# Patient Record
Sex: MALE | Race: BLACK OR AFRICAN AMERICAN | NOT HISPANIC OR LATINO | Employment: UNEMPLOYED | URBAN - METROPOLITAN AREA
[De-identification: names, ages, dates, MRNs, and addresses within clinical notes are randomized per-mention and may not be internally consistent; named-entity substitution may affect disease eponyms.]

---

## 2021-05-29 ENCOUNTER — RECORDS - HEALTHEAST (OUTPATIENT)
Dept: ADMINISTRATIVE | Facility: CLINIC | Age: 46
End: 2021-05-29

## 2021-05-31 ENCOUNTER — RECORDS - HEALTHEAST (OUTPATIENT)
Dept: ADMINISTRATIVE | Facility: CLINIC | Age: 46
End: 2021-05-31

## 2021-06-01 ENCOUNTER — RECORDS - HEALTHEAST (OUTPATIENT)
Dept: ADMINISTRATIVE | Facility: CLINIC | Age: 46
End: 2021-06-01

## 2021-06-02 ENCOUNTER — RECORDS - HEALTHEAST (OUTPATIENT)
Dept: ADMINISTRATIVE | Facility: CLINIC | Age: 46
End: 2021-06-02

## 2023-06-16 ENCOUNTER — APPOINTMENT (OUTPATIENT)
Dept: RADIOLOGY | Facility: HOSPITAL | Age: 48
End: 2023-06-16
Attending: EMERGENCY MEDICINE
Payer: MEDICAID

## 2023-06-16 ENCOUNTER — HOSPITAL ENCOUNTER (EMERGENCY)
Facility: HOSPITAL | Age: 48
Discharge: HOME | End: 2023-06-17
Attending: EMERGENCY MEDICINE | Admitting: EMERGENCY MEDICINE
Payer: MEDICAID

## 2023-06-16 DIAGNOSIS — M25.572 LEFT ANKLE PAIN, UNSPECIFIED CHRONICITY: ICD-10-CM

## 2023-06-16 DIAGNOSIS — S82.892A ANKLE FRACTURE, LEFT, CLOSED, INITIAL ENCOUNTER: ICD-10-CM

## 2023-06-16 PROCEDURE — 99284 EMERGENCY DEPT VISIT MOD MDM: CPT | Mod: 25

## 2023-06-16 PROCEDURE — 27786 TREATMENT OF ANKLE FRACTURE: CPT | Mod: LT

## 2023-06-16 PROCEDURE — 73610 X-RAY EXAM OF ANKLE: CPT | Mod: RT

## 2023-06-17 VITALS
HEART RATE: 72 BPM | TEMPERATURE: 98.5 F | HEIGHT: 66 IN | WEIGHT: 197 LBS | SYSTOLIC BLOOD PRESSURE: 187 MMHG | DIASTOLIC BLOOD PRESSURE: 107 MMHG | RESPIRATION RATE: 16 BRPM | BODY MASS INDEX: 31.66 KG/M2 | OXYGEN SATURATION: 99 %

## 2023-06-17 PROCEDURE — 250N000013 HC RX MED GY IP 250 OP 250 PS 637: Performed by: EMERGENCY MEDICINE

## 2023-06-17 RX ORDER — IBUPROFEN 600 MG/1
600 TABLET, FILM COATED ORAL ONCE
Status: COMPLETED | OUTPATIENT
Start: 2023-06-17 | End: 2023-06-17

## 2023-06-17 RX ORDER — HYDROCODONE BITARTRATE AND ACETAMINOPHEN 5; 325 MG/1; MG/1
1 TABLET ORAL EVERY 6 HOURS PRN
Qty: 6 TABLET | Refills: 0 | Status: SHIPPED | OUTPATIENT
Start: 2023-06-17 | End: 2023-06-20

## 2023-06-17 RX ADMIN — IBUPROFEN 600 MG: 600 TABLET ORAL at 00:21

## 2023-06-17 NOTE — ED PROVIDER NOTES
"EMERGENCY DEPARTMENT ENCOUNTER      NAME: Rinku Jara  YOB: 1975  MRN: [id number]    FINAL IMPRESSION  1. Ankle fracture, left, closed, initial encounter    2. Left ankle pain, unspecified chronicity        MEDICAL DECISION MAKING   Pertinent Labs & Imaging studies reviewed. (See chart for details)    Rinku Jara is a 47 year old male who presents for evaluation of ankle pain.  Patient reports that he fell on [place], 3/17, and twisted his left ankle.  He was seen in clinic at that time and diagnosed with a fracture but reports that he became frustrated and felt as though his clinic was just \"trying to make money off me.\"  As such, he has not followed through with recommendations regarding this injury.  He does have a brace but reports that he never obtained the specific one they recommended.  He decided to come into the ED today due to persistence of pain which she states is now radiating up towards his back and into his contralateral lower extremity.  He has not had any new injuries since that on 3/17.  He reports that the pain is mostly localized over the lateral aspect of his ankle and he has associated swelling.  He has been using OTC analgesics without much relief. Remainder of history and exam, as below.     Records reviewed.  Patient was seen at urgent care on 3/31 for evaluation of ankle pain that started 7 days previously.  An x-ray at that time revealed a left distal fibular fracture with slight displacement.  He was given an air stirrup splint and told to wear this for 3 weeks.  He was seen again at urgent care on 5/1/2023 with complaints of 2 to 3 weeks of bilateral leg pain and ongoing pain in his left ankle.  A repeat x-ray of the ankle showed a mildly displaced fracture of the lateral malleolus, essentially unchanged.  It was felt that his back pain was related to sciatica and he was started on a steroid taper.  He was also given a referral to orthopedics.  Patient was " seen by orthopedics on 5/3 and had a repeat x-ray that showed a fracture with incomplete healing.  It was felt that majority of his pain was coming from his sciatic type symptoms.  He was advised to continue using the ankle brace, and start physical therapy.  Finally, he was seen in urgent care on 5/11/2023 with complaints of back pain.  It was felt that his symptoms were related to lumbar radiculopathy.  He was given instructions for conservative management.    Today, an x-ray of the ankle was obtained while patient was awaiting evaluation in triage.  Per my read, this showed evidence of a chronic appearing fracture of the distal fibula without obvious acute new injury.  Distal CMS is intact so I have low suspicion for neurovascular injury.  At this point, I suspect ongoing pain is related to now chronic appearing injury of the ankle.  He is not concerned about his back pain today is much as he is his ankle.  I suspect his sciatic type pain and low back pain is related to him compensating for left ankle pain, especially given it did not improve much with steroid taper.  I discussed the case with [facility] who agreed with plan to place patient in a cam boot and have him follow-up in clinic.  I updated patient with these recommendations.  He felt comfortable with this plan.  We will send with left small number of Norco to use for pain not relieved by Tylenol/Motrin.    Patient was fitted with a cam boot and given a dose of Motrin.  After this, he was eager and ready to go home.  Strict return precautions and follow up recommendations were discussed and all questions were answered. Patient endorsed understanding and was in agreement with plan.    I independently reviewed XR (as noted above), formal radiologist read pending.      Medical Decision Making    History:    Supplemental history from: N/A    External Record(s) reviewed: Prior Imaging: Prior x-rays of the left ankle    Work Up:    Chart documentation  includes differential considered and any EKGs or imaging independently interpreted by provider, where specified.    In additional to work up documented, I considered the following work up: Documented in chart, if applicable.    External consultation:    Discussion of management with another provider: Documented in chart, if applicable    Complicating factors:    Care impacted by chronic illness: N/A    Care affected by social determinants of health: Access to Medical Care and Access to Affordable Health Care    Disposition considerations: Discharge. I prescribed additional prescription strength medication(s) as charted. See documentation for any additional details.          ED COURSE  12:00 AM I introduced myself to the patient, obtained patient history, performed a physical exam, and discussed plan for ED workup including potential diagnostic laboratory/imaging studies and interventions.  12:33 AM Spoke with Dr. Denise, orthopedics. Discussed the patient's case and follow-up recommendations. They will have someone from [facility] call the patient to schedule outpatient follow-up.  12:42 AM Rechecked and updated the patient. We discussed the plan for discharge and the patient is agreeable. Reviewed supportive cares, symptomatic treatment, outpatient follow up, and reasons to return to the Emergency Department. Patient to be discharged by ED RN.       MEDICATIONS GIVEN IN THE ED  Medications   ibuprofen (ADVIL/MOTRIN) tablet 600 mg (600 mg Oral $Given 6/17/23 0021)       NEW PRESCRIPTIONS STARTED AT TODAY'S VISIT  Discharge Medication List as of 6/17/2023 12:49 AM             =================================================================    Chief Complaint   Patient presents with     Ankle Pain         HPI:    Patient information was obtained from: Patient    Use of : N/A    Rinku Jara is a 47 year old male who presents for evaluation fever left ankle pain.    The patient reports on St.  "[place] Day (approximately 3 months ago) he slipped in a parking lot and injured his left ankle. After the slip he had left ankle pain and swelling. A couple weeks later he had imaging of the ankle and was told he had fractured the ankle. He was following up through the [facility] clinic, however, he became frustrated because he was having issues with his insurance and it seemed that every visit he had with the clinic was more about money than actually helping him heal.    At home he has been using Tylenol for pain control and has an ankle brace he purchased which he tries to wear to help the pain. He has never used crutches. He has not had any additional injury to the left ankle. He is presenting this evening with ongoing left ankle pain. He describes the pain as sharp and burning in nature.    RELEVANT HISTORY, MEDICATIONS, & ALLERGIES   Past medical history, surgical history, family history, medications, and allergies reviewed and pertinent noted in HPI.    REVIEW OF SYSTEMS:  A complete review of systems was performed with pertinent positives and negatives noted in the HPI. All other systems negative.     PHYSICAL EXAM:    Vitals: BP (!) 187/107   Pulse 72   Temp 98.5  F (36.9  C) (Temporal)   Resp 16   Ht 1.676 m (5' 6\")   Wt 89.4 kg (197 lb)   SpO2 99%   BMI 31.80 kg/m    General: Awake, alert, interactive.   Eyes: PERRL.   HENT: Atraumatic. MMM.   Neck: Full AROM.  Cardiovascular: Regular rate.  Respiratory/Chest: Normal work of breathing.   Abdomen: Non-distended.   Musculoskeletal: Normal appearing upper and right lower extremities without obvious deformities or signs of trauma.   Left lower extremity: Diffuse tenderness palpation about bilateral malleoli, worse laterally.  Palpable distal pulses.  Sensation intact throughout all distributions.  No tenderness palpation of distal lower extremity.  Slightly limited range of motion of ankle secondary to pain.  Normal range of motion of knee " and hip.  Skin: Normal color. No rash or diaphoresis.  Neurologic: Alert, oriented. Speech clear. CN's grossly intact. Moving all extremities spontaneously.  Ambulatory with slightly antalgic gait.  No assistance required.  Psychiatric: Normal affect/mood.         RADIOLOGY  Ankle XR, G/E 3 views, right   Final Result   IMPRESSION: There appears to be a chronic fracture deformity of the distal fibula. There is bandlike lucency through the distal fibula raising concern for an acute fracture. Correlation with clinical examination and patient history recommended. Follow-up    radiographs or CT could be performed for further evaluation. Mortise remains congruent. Mild enthesopathy at the Achilles insertion and origin of plantar fascia.            I, Wayne Ahumada, am serving as a scribe to document services personally performed by Dr. Dorothy Infante based on my observation and the provider's statements to me. IDorothy MD attest that Wayne Ahumada is acting in a scribe capacity, has observed my performance of the services and has documented them in accordance with my direction.    Dorothy Infante M.D.  Emergency Medicine  [facility] EMERGENCY DEPARTMENT  [facility] [facility] [id number]  [phone]  Dept: [phone]     Dorothy Infante MD  06/17/23 0451

## 2023-06-17 NOTE — DISCHARGE INSTRUCTIONS
You were seen in the emergency department today for ankle pain.  As we discussed, your x-ray showed that you broke the bone back in March.     To help with pain:  - Ice the injury for 20 minutes, 3-5 times per day (or up to every 2 hours as needed) for the first 24-72 hours. You can either use an ice pack or plastic bag filled with ice, cover either one with a damp cloth to prevent the cold from burning your skin.   - Take 650-1000 mg of Acetaminophen (Tylenol) (no more than 3000 mg in 24 hours).  - Take 600 mg of Ibuprofen (Motrin, Advil) by mouth with food every 6-8 hours (no more than 3200 mg in 24hrs).   - Norco every 4-6 hours for the next 24-48 hours. Only use this for severe pain that does not get better with tylenol and ibuprofen. It will likely make you feel drowsy so you should try to take it at night to lessen the pain and help you sleep. This DOES contain 325 mg acetaminophen so if you take it, be sure you are not taking too much tylenol in addition.     You should take ibuprofen and tylenol for baseline pain. You can take one or the other every 3 hours while awake (such that each is taken every 6 hours). For example, if you take Tylenol when you get home then you would take ibuprofen 3 hours later followed by another Tylenol dose 3 hours after that. Write down the times you are taking both medications to ensure appropriate time in between doses.    Note: Norco is a strong narcotic pain medication that can lead to addiction and should be used carefully.  Please don't take more than the recommended dose and limit your use of this medication as much as possible.  Please don't take this medication with other medications or drugs that cause you to feel sleepy (alcohol, benzodiazepines, etc) because it may impair your ability to breathe.  In addition, please do not take it prior to performing activities such as driving, operating power tools, or other activities that carry a risk of bodily harm.     You will  likely find that you are more sore over the next 1-2 days, but please return to the Emergency Department if you have a significant increase in pain, difficulty breathing, numbness, weakness, or any other new or concerning symptoms. Otherwise please follow up with the orthopedic surgery team in 1 week for recheck.    Below is some information that you might find informative and useful.    Thank you for choosing [facility]. It was a pleasure taking care of you today!  - Dr. Dorothy Infante

## 2023-06-17 NOTE — ED TRIAGE NOTES
"Pt ambulated into ED with c/o right ankle pain.  Pt reports slip and fall in a parking lot on [place] on [place] 17th.  Pt last took Tylenol for pain \"a few hours ago\".      "

## 2023-08-08 ENCOUNTER — HOSPITAL ENCOUNTER (EMERGENCY)
Facility: HOSPITAL | Age: 48
Discharge: HOME | End: 2023-08-08
Attending: EMERGENCY MEDICINE | Admitting: EMERGENCY MEDICINE
Payer: COMMERCIAL

## 2023-08-08 VITALS
OXYGEN SATURATION: 99 % | BODY MASS INDEX: 31.98 KG/M2 | RESPIRATION RATE: 18 BRPM | WEIGHT: 199 LBS | HEART RATE: 88 BPM | TEMPERATURE: 97.3 F | SYSTOLIC BLOOD PRESSURE: 176 MMHG | DIASTOLIC BLOOD PRESSURE: 109 MMHG | HEIGHT: 66 IN

## 2023-08-08 DIAGNOSIS — M54.50 ACUTE EXACERBATION OF CHRONIC LOW BACK PAIN: ICD-10-CM

## 2023-08-08 DIAGNOSIS — G89.29 ACUTE EXACERBATION OF CHRONIC LOW BACK PAIN: ICD-10-CM

## 2023-08-08 PROCEDURE — 96372 THER/PROPH/DIAG INJ SC/IM: CPT | Performed by: EMERGENCY MEDICINE

## 2023-08-08 PROCEDURE — 250N000011 HC RX IP 250 OP 636: Mod: JZ | Performed by: EMERGENCY MEDICINE

## 2023-08-08 PROCEDURE — 250N000013 HC RX MED GY IP 250 OP 250 PS 637: Performed by: EMERGENCY MEDICINE

## 2023-08-08 PROCEDURE — 99284 EMERGENCY DEPT VISIT MOD MDM: CPT

## 2023-08-08 RX ORDER — ACETAMINOPHEN 325 MG/1
975 TABLET ORAL ONCE
Status: COMPLETED | OUTPATIENT
Start: 2023-08-08 | End: 2023-08-08

## 2023-08-08 RX ORDER — OXYCODONE HYDROCHLORIDE 5 MG/1
5 TABLET ORAL ONCE
Status: COMPLETED | OUTPATIENT
Start: 2023-08-08 | End: 2023-08-08

## 2023-08-08 RX ORDER — OXYCODONE HYDROCHLORIDE 5 MG/1
5 TABLET ORAL EVERY 6 HOURS PRN
Qty: 10 TABLET | Refills: 0 | Status: SHIPPED | OUTPATIENT
Start: 2023-08-08 | End: 2023-08-11

## 2023-08-08 RX ORDER — KETOROLAC TROMETHAMINE 30 MG/ML
30 INJECTION, SOLUTION INTRAMUSCULAR; INTRAVENOUS ONCE
Status: COMPLETED | OUTPATIENT
Start: 2023-08-08 | End: 2023-08-08

## 2023-08-08 RX ORDER — GABAPENTIN 300 MG/1
CAPSULE ORAL
Qty: 45 CAPSULE | Refills: 0 | Status: SHIPPED | OUTPATIENT
Start: 2023-08-08 | End: 2023-11-30

## 2023-08-08 RX ORDER — OXYCODONE HYDROCHLORIDE 5 MG/1
10 TABLET ORAL ONCE
Status: DISCONTINUED | OUTPATIENT
Start: 2023-08-08 | End: 2023-08-08

## 2023-08-08 RX ADMIN — ACETAMINOPHEN 975 MG: 325 TABLET ORAL at 05:51

## 2023-08-08 RX ADMIN — OXYCODONE HYDROCHLORIDE 5 MG: 5 TABLET ORAL at 05:51

## 2023-08-08 RX ADMIN — KETOROLAC TROMETHAMINE 30 MG: 30 INJECTION, SOLUTION INTRAMUSCULAR; INTRAVENOUS at 05:51

## 2023-08-08 ASSESSMENT — ENCOUNTER SYMPTOMS
NUMBNESS: 1
BACK PAIN: 1

## 2023-08-08 ASSESSMENT — ACTIVITIES OF DAILY LIVING (ADL): ADLS_ACUITY_SCORE: 36

## 2023-08-08 NOTE — ED TRIAGE NOTES
Sciatic pain of 10 shooting down back into both legs. He states he has had back pains since breaking left ankle. He wants pain control. Had MRI within month.     Triage Assessment       Row Name 08/08/23 0518       Triage Assessment (Adult)    Airway WDL WDL       Respiratory WDL    Respiratory WDL WDL       Skin Circulation/Temperature WDL    Skin Circulation/Temperature WDL WDL       Cardiac WDL    Cardiac WDL X  HTN       Peripheral/Neurovascular WDL    Peripheral Neurovascular WDL WDL       Cognitive/Neuro/Behavioral WDL    Cognitive/Neuro/Behavioral WDL WDL

## 2023-08-08 NOTE — ED NOTES
Pt alert and oriented x4. Ambulated with a steady gait. Discharged to home by self. Pt given printed rx. Pt referred to spine clinic.

## 2023-08-08 NOTE — DISCHARGE INSTRUCTIONS
The prescribed oxycodone as needed for any further pain in addition to over-the-counter ibuprofen.   You can also take the prescribed gabapentin as directed to help with the burning nerve type pain.      Referral has been placed for you to follow-up with the spine clinic.    Turn back to ED sooner for any worsening back pain, numbness/tingling/weakness in your legs, loss of bowel or bladder control, or any other new or concerning symptoms.

## 2023-08-08 NOTE — ED PROVIDER NOTES
EMERGENCY DEPARTMENT ENCOUNTER      NAME: Rinku Jara  AGE: 47 year old male  YOB: 1975  MRN: [id number]  EVALUATION DATE & TIME: 2023  5:20 AM    PCP: No Ref-Primary, Physician    ED PROVIDER: Trenton Bernal DO      Chief Complaint   Patient presents with    Back Pain         FINAL IMPRESSION:  1. Acute exacerbation of chronic low back pain          ED COURSE & MEDICAL DECISION MAKIN-year-old male with a history of low back pain that has been ongoing for the last few months presented to the ED for evaluation of an estimation of his chronic low back pain that started a few days ago.  The patient denied any new trauma or injury.  Patient states that the pain radiates down both legs but he denied any worsening numbness/tingling/weakness in the lower extremities or any associated loss of bowel or bladder control.  Upon arrival to the ED the patient was hypertensive and tachycardic.  He was otherwise hemodynamically stable.  The patient did not appear to be in any obvious distress at the time of his initial evaluation.  On exam the patient had mild nasal patient noted over the midline low back.  However, there were no focal neurovascular deficits noted to the bilateral lower extremities.      Prior to my evaluation the patient received oxycodone, Tylenol, and IM Toradol.  Patient stated that the pain had improved with these medications at the time of his initial evaluation.      Of note, the patient states that he underwent an MRI of his lumbar spine within the last month for the same pain.  I was unable to find the results of the MRI within [facility].  However, the patient states that he was never informed of any acute abnormalities by the ordering orthopedic provider.    Because there were no focal vascular deficits noted on exam, repeat imaging with an MRI was not felt to be necessary.  Because there were no new reports of trauma or injury x-rays were also not indicated at this time.    After  educating and reassuring the patient he felt comfortable returning home.  The patient was sent home with a few tabs of oxycodone to take as needed for any further pain in addition to over-the-counter ibuprofen.  The patient was also sent home with a gabapentin taper to use for the next 2 weeks as his pain seems to be neuropathic in nature.  Referral to the spine [place] was also placed for the patient here in the ED.  The patient was instructed to follow-up with the spine clinic for reevaluation or to return back to ED sooner for any worsening pain, worsening numbness/ting/weakness in the lower extremities, loss of bowel bladder control, or any other new or concerning symptoms.    Pertinent Labs & Imaging studies reviewed. (See chart for details)  7:10 AM I met with the patient to gather history and to perform my initial exam. We discussed plans for the ED course, including diagnostic testing and treatment.       At the conclusion of the encounter I discussed the results of all of the tests and the disposition. The questions were answered. The patient or family acknowledged understanding and was agreeable with the care plan.     Medical Decision Making    History:  Supplemental history from: Documented in chart, if applicable  External Record(s) reviewed: Documented in chart, if applicable.    Work Up:  Chart documentation includes differential considered and any EKGs or imaging independently interpreted by provider, where specified.  In additional to work up documented, I considered the following work up: Documented in chart, if applicable.    External consultation:  Discussion of management with another provider: Documented in chart, if applicable    Complicating factors:  Care impacted by chronic illness: N/A  Care affected by social determinants of health: N/A    Disposition considerations: Discharge. I prescribed additional prescription strength medication(s) as charted. N/A.      PPE worn: n95 mask,  goggles    MEDICATIONS GIVEN IN THE EMERGENCY:  Medications   ketorolac (TORADOL) injection 30 mg (30 mg Intramuscular $Given 8/8/23 0551)   acetaminophen (TYLENOL) tablet 975 mg (975 mg Oral $Given 8/8/23 0551)   oxyCODONE (ROXICODONE) tablet 5 mg (5 mg Oral $Given 8/8/23 0551)       NEW PRESCRIPTIONS STARTED AT TODAY'S ER VISIT  Discharge Medication List as of 8/8/2023  7:41 AM             =================================================================    HPI    Patient information was obtained from: patient    Use of : N/A      Rinkufreddie Jara is a 47 year old male with no pertinent history who presents to this ED by private car for evaluation of back pain.    The patient presented that he has been experiencing back pain that radiates down both of his legs for the last couple months. He reported that it got worse last night and is now a sharp and burning pain. He has taken ibuprofen and tylenol with no relief.    He also presents numbness in his buttocks. Of note, he stated that he fractured his left ankle in March from a fall.    No recent falls or injuries. He denies loss of bowel or bladder control. No other acute symptoms presented.       REVIEW OF SYSTEMS   Review of Systems   Musculoskeletal:  Positive for back pain (sharp and burning).   Neurological:  Positive for numbness (in his buttocks).   All other systems reviewed and are negative.       PAST MEDICAL HISTORY:  No past medical history on file.    PAST SURGICAL HISTORY:  Past Surgical History:   Procedure Laterality Date    REPAIR TENDON ACHILLES             CURRENT MEDICATIONS:    gabapentin (NEURONTIN) 300 MG capsule  oxyCODONE (ROXICODONE) 5 MG tablet  cholecalciferol, vitamin D3, 2,000 unit Tab  dextroamphetamine-amphetamine (ADDERALL) 20 mg Tab  dextroamphetamine-amphetamine (AMPHETAMINE-DEXTROAMPHETAMINE) 30 mg Tab  DULoxetine (CYMBALTA) 60 MG capsule  DULOXETINE HCL (CYMBALTA ORAL)  etodolac (LODINE) 300 MG capsule  metFORMIN  "(GLUCOPHAGE) 1000 MG tablet  metoclopramide (REGLAN) 10 MG tablet  miscellaneous medical supply [facility]  ondansetron (ZOFRAN ODT) 4 MG disintegrating tablet  traMADol (ULTRAM) 50 mg tablet  VITAMIN A/VITAMIN D3 (NATURAL VITAMIN D ORAL)        ALLERGIES:  No Known Allergies    FAMILY HISTORY:  No family history on file.    SOCIAL HISTORY:   Social History     Socioeconomic History    Marital status: Single   Tobacco Use    Smoking status: Former     Types: Cigarettes   Substance and Sexual Activity    Alcohol use: No    Drug use: No       VITALS:  BP (!) 176/109   Pulse 88   Temp 97.3  F (36.3  C) (Temporal)   Resp 18   Ht 1.676 m (5' 6\")   Wt 90.3 kg (199 lb)   SpO2 99%   BMI 32.12 kg/m      PHYSICAL EXAM    General presentation: Alert, Vital signs reviewed. NAD  HENT: ENT inspection is normal. Oropharynx is moist and clear.   Eye: Pupils are equal and reactive to light. EOMI  Neck: The neck is supple, with full ROM, with no evidence of meningismus.  Pulmonary: Currently in no acute respiratory distress. Normal, non labored respirations, the lung sounds are normal with good equal air movement. Clear to auscultation bilaterally.   Circulatory: Regular rate and rhythm. Peripheral pulses are strong and equal. No murmurs, rubs, or gallops.   Abdominal: The abdomen is soft. Nontender. No rigidity, guarding, or rebound. Bowel sounds normal.   Neurologic: Alert, oriented to person, place, and time.  Strength is 5/5 in the bilateral lower extremities.  No sensory discrepancies to light touch noted in the bilateral lower extremities.  Straight leg raising test is negative bilaterally. Cranial nerves II through XII are intact.   Musculoskeletal: No extremity tenderness. Full range of motion in all extremities. No extremity edema.  Mild tenderness palpation noted over the lower lumbar spine.  Skin: Skin color is normal. No rash. Warm. Dry to touch.       Radha PATEL  , am serving as a scribe to document services " personally performed by Trenton Bernal DO based on my observation and the provider's statements to me. I, Trenton Bernal, attest that Radha Sanches  is acting in a scribe capacity, has observed my performance of the services and has documented them in accordance with my direction.    Trenton Bernal DO  Emergency Medicine  [facility] EMERGENCY DEPARTMENT  [address] [place] [id number] [phone]       rTenton Bernal DO  08/08/23 0837

## 2023-08-24 ENCOUNTER — HOSPITAL ENCOUNTER (EMERGENCY)
Facility: HOSPITAL | Age: 48
Discharge: HOME | End: 2023-08-24
Attending: EMERGENCY MEDICINE | Admitting: EMERGENCY MEDICINE
Payer: COMMERCIAL

## 2023-08-24 VITALS
DIASTOLIC BLOOD PRESSURE: 99 MMHG | HEART RATE: 83 BPM | RESPIRATION RATE: 18 BRPM | OXYGEN SATURATION: 100 % | TEMPERATURE: 98.7 F | SYSTOLIC BLOOD PRESSURE: 163 MMHG

## 2023-08-24 DIAGNOSIS — M54.50 CHRONIC RIGHT-SIDED LOW BACK PAIN WITHOUT SCIATICA: ICD-10-CM

## 2023-08-24 DIAGNOSIS — G89.29 CHRONIC RIGHT-SIDED LOW BACK PAIN WITHOUT SCIATICA: ICD-10-CM

## 2023-08-24 DIAGNOSIS — R03.0 ELEVATED BLOOD PRESSURE READING WITHOUT DIAGNOSIS OF HYPERTENSION: ICD-10-CM

## 2023-08-24 PROCEDURE — 250N000013 HC RX MED GY IP 250 OP 250 PS 637: Performed by: EMERGENCY MEDICINE

## 2023-08-24 PROCEDURE — 99284 EMERGENCY DEPT VISIT MOD MDM: CPT

## 2023-08-24 RX ORDER — METHYLPREDNISOLONE 4 MG
TABLET, DOSE PACK ORAL
Qty: 21 TABLET | Refills: 0 | Status: SHIPPED | OUTPATIENT
Start: 2023-08-24

## 2023-08-24 RX ORDER — METHOCARBAMOL 500 MG/1
500 TABLET, FILM COATED ORAL ONCE
Status: COMPLETED | OUTPATIENT
Start: 2023-08-24 | End: 2023-08-24

## 2023-08-24 RX ORDER — IBUPROFEN 600 MG/1
600 TABLET, FILM COATED ORAL ONCE
Status: COMPLETED | OUTPATIENT
Start: 2023-08-24 | End: 2023-08-24

## 2023-08-24 RX ORDER — METHOCARBAMOL 500 MG/1
500 TABLET, FILM COATED ORAL 4 TIMES DAILY PRN
Qty: 40 TABLET | Refills: 0 | Status: SHIPPED | OUTPATIENT
Start: 2023-08-24

## 2023-08-24 RX ADMIN — IBUPROFEN 600 MG: 600 TABLET ORAL at 05:42

## 2023-08-24 RX ADMIN — METHOCARBAMOL 500 MG: 500 TABLET, FILM COATED ORAL at 05:42

## 2023-08-24 ASSESSMENT — ACTIVITIES OF DAILY LIVING (ADL): ADLS_ACUITY_SCORE: 35

## 2023-08-24 NOTE — ED PROVIDER NOTES
EMERGENCY DEPARTMENT ENCOUNTER      NAME: Rinku Jara  AGE: 47 year old male  YOB: 1975  MRN: [id number]  EVALUATION DATE & TIME: 8/24/2023  4:37 AM    PCP: No Ref-Primary, Physician    ED PROVIDER: Radha Carbajal M.D.      Chief Complaint   Patient presents with    Leg Pain         FINAL IMPRESSION:  1. Chronic right-sided low back pain without sciatica    2. Elevated blood pressure reading without diagnosis of hypertension          ED COURSE & MEDICAL DECISION MAKING:    ED Course as of 08/24/23 0600   Thu Aug 24, 2023   0536 Patient with chronic ongoing nonmidline atruamatic back pain to right flank region with neuro intact examination and elevated BP which he notes he knows about and has not yet elected to follow up about but knows he should ultimately follow up for, ameanble to retrial of medrol dose pack with robaxin and spine follow up  referral to discuss whether the spine center recommends outpatient imaging studies for his ongoing atraumatic afebrile back pain neuro intact without h/o surgery. He was given primary care referral again for BP recheck and to discuss whether they recommend starting on antihypertensive therapy as his elevated BP could be dangerous in the future if untreated and certainly could predispose him to heart and vascular disease, he is aware and notes he will follow up and wants to update his phone number with registration as he previously gave a number that is not currently working, registration will check contact info with him now. Patient discharged after being provided with extensive anticipatory guidance and given return precautions, importance of PMD follow-up emphasized. Rx medrol dose pack and robaxin to preferred pharmacy.       Pertinent Labs & Imaging studies reviewed. (See chart for details)    N95 worn  A face shield was worn also  COVID PPE    Medical Decision Making    History:  Supplemental history from: Documented in chart, if  applicable  External Record(s) reviewed: Documented in chart, if applicable.    Work Up:  Chart documentation includes differential considered and any EKGs or imaging independently interpreted by provider, where specified.  In additional to work up documented, I considered the following work up: Documented in chart, if applicable.    External consultation:  Discussion of management with another provider: Documented in chart, if applicable    Complicating factors:  Care impacted by chronic illness: Chronic Pain  Care affected by social determinants of health: Access to Medical Care    Disposition considerations: Discharge. I prescribed additional prescription strength medication(s) as charted. See documentation for any additional details.        At the conclusion of the encounter I discussed the results of all of the tests and the disposition. The questions were answered. The patient or family acknowledged understanding and was agreeable with the care plan.     MEDICATIONS GIVEN IN THE EMERGENCY:  Medications   methocarbamol (ROBAXIN) tablet 500 mg (500 mg Oral $Given 8/24/23 0542)   ibuprofen (ADVIL/MOTRIN) tablet 600 mg (600 mg Oral $Given 8/24/23 0542)       NEW PRESCRIPTIONS STARTED AT TODAY'S ER VISIT  Discharge Medication List as of 8/24/2023  5:43 AM        START taking these medications    Details   methocarbamol (ROBAXIN) 500 MG tablet Take 1 tablet (500 mg) by mouth 4 times daily as needed for muscle spasms, Disp-40 tablet, R-0, E-Prescribe      methylPREDNISolone (MEDROL DOSEPAK) 4 MG tablet therapy pack Follow Package Directions, Disp-21 tablet, R-0, E-Prescribe                =================================================================    HPI      Rinku Jara is a 47 year old male with PMHx of chronic back pain since 2015 who presents to the ED today via ambulation with back pain and left leg pain.    Patient endorses chronic, ongoing midline lower back pain and sciatica pain to his feet. He  denies having seen a specialist as he does not have medicare. Has been taking ibuprofen for his back pain, last dose at 2300. Denies any history of back surgeries, recent falls or trauma, fevers, urinary issues, cough, or shortness of breath.     REVIEW OF SYSTEMS   All other systems reviewed and are negative except as noted above in HPI.    PAST MEDICAL HISTORY:  No past medical history on file.    PAST SURGICAL HISTORY:  Past Surgical History:   Procedure Laterality Date    REPAIR TENDON ACHILLES         CURRENT MEDICATIONS:    methocarbamol (ROBAXIN) 500 MG tablet  methylPREDNISolone (MEDROL DOSEPAK) 4 MG tablet therapy pack  cholecalciferol, vitamin D3, 2,000 unit Tab  dextroamphetamine-amphetamine (ADDERALL) 20 mg Tab  dextroamphetamine-amphetamine (AMPHETAMINE-DEXTROAMPHETAMINE) 30 mg Tab  DULoxetine (CYMBALTA) 60 MG capsule  DULOXETINE HCL (CYMBALTA ORAL)  etodolac (LODINE) 300 MG capsule  gabapentin (NEURONTIN) 300 MG capsule  metFORMIN (GLUCOPHAGE) 1000 MG tablet  metoclopramide (REGLAN) 10 MG tablet  miscellaneous medical supply Mis  ondansetron (ZOFRAN ODT) 4 MG disintegrating tablet  traMADol (ULTRAM) 50 mg tablet  VITAMIN A/VITAMIN D3 (NATURAL VITAMIN D ORAL)        ALLERGIES:  No Known Allergies    FAMILY HISTORY:  No family history on file.    SOCIAL HISTORY:   Social History     Socioeconomic History    Marital status: Single   Tobacco Use    Smoking status: Former     Types: Cigarettes   Substance and Sexual Activity    Alcohol use: No    Drug use: No       VITALS:  Patient Vitals for the past 24 hrs:   BP Temp Temp src Pulse Resp SpO2   08/24/23 0546 -- -- -- 83 -- 100 %   08/24/23 0545 (!) 163/99 -- -- 83 -- 100 %   08/24/23 0530 (!) 176/100 -- -- -- -- --   08/24/23 0515 (!) 193/108 -- -- -- -- --   08/24/23 0500 (!) 206/118 -- -- -- -- --   08/24/23 0437 (!) 181/115 -- -- -- -- --   08/24/23 0432 -- 98.7  F (37.1  C) Oral 87 18 100 %       PHYSICAL EXAM    GENERAL: Awake, alert.  In no acute  distress.   HEENT: Normocephalic, atraumatic.  Pupils equal, round and reactive.  Conjunctiva normal.  EOMI.  NECK: No stridor or apparent deformity.  PULMONARY: Symmetrical breath sounds without distress.  Lungs clear to auscultation bilaterally without wheezes, rhonchi or rales.  CARDIO: Regular rate and rhythm.  No significant murmur, rub or gallop.  Radial pulses strong and symmetrical.  ABDOMINAL: Abdomen soft, non-distended and non-tender to palpation.  No CVAT, no palpable hepatosplenomegaly.  EXTREMITIES: No lower extremity swelling or edema.    NEURO: Alert and oriented to person, place and time.  Cranial nerves grossly intact.  No focal motor deficit. Bilateral SLR negative. No clonus. No saddle anesthesia. Bilateral reflex 2+ and symmetric. No tenderness at the midline through all levels palpated.   PSYCH: Normal mood and affect  SKIN: No rashes        I, Ortiz Matute, am serving as a scribe to document services personally performed by Dr. Radha Carbajal based on my observation and the provider's statements to me. IRadha MD attest that Ortiz Matute is acting in a scribe capacity, has observed my performance of the services and has documented them in accordance with my direction.       Radha Carbajal MD  08/24/23 0617

## 2023-08-24 NOTE — ED TRIAGE NOTES
Rinku presents to triage by self from home. Initial complaint is sciatic nerve pain that is in his lower back radiating to his legs and down to both feet. States this has been going on for months and hasn't gotten answer for the pain.     Triage Assessment       Row Name 08/24/23 0434       Triage Assessment (Adult)    Airway WDL WDL       Respiratory WDL    Respiratory WDL WDL       Skin Circulation/Temperature WDL    Skin Circulation/Temperature WDL WDL       Cardiac WDL    Cardiac WDL X  hypertensive       Peripheral/Neurovascular WDL    Peripheral Neurovascular WDL X       LLE Neurovascular Assessment    Sensation LLE tingling present       RLE Neurovascular Assessment    Sensation RLE tingling present       Cognitive/Neuro/Behavioral WDL    Cognitive/Neuro/Behavioral WDL WDL

## 2023-08-24 NOTE — Clinical Note
Rinku Jara was seen and treated in our emergency department on 8/24/2023.  He may return to work on 08/25/2023.       If you have any questions or concerns, please don't hesitate to call.      Radha Carbajal MD

## 2023-08-24 NOTE — DISCHARGE INSTRUCTIONS
Our back care specialists will call you this week to help you to set up an appointment in their office in the next week about your ongoing back pain and so they can make sure the medication you received tonight in the ER as a prescription is helping your back and to talk to you about whether imaging tests are recommended.    Your blood pressure was very high in the ER and our primary care team will call you to help you to set up an appointment to recheck your blood pressure in their office and so they can talk with you about whether they recommend you start medicine for your blood pressure.

## 2023-08-24 NOTE — ED NOTES
"Pt endorsing left foot pain primarily. Does have a hx of gout. But does not remind him of the gout he has had. On writer assessment does end up reporting bilateral foot pain that \"shoots\". Has not been seen for this before but has been experiencing for months.  "

## 2023-09-18 ENCOUNTER — TRANSFERRED RECORDS (OUTPATIENT)
Dept: HEALTH INFORMATION MANAGEMENT | Facility: CLINIC | Age: 48
End: 2023-09-18

## 2023-11-30 ENCOUNTER — APPOINTMENT (OUTPATIENT)
Dept: RADIOLOGY | Facility: HOSPITAL | Age: 48
End: 2023-11-30
Attending: EMERGENCY MEDICINE
Payer: COMMERCIAL

## 2023-11-30 ENCOUNTER — HOSPITAL ENCOUNTER (EMERGENCY)
Facility: HOSPITAL | Age: 48
Discharge: HOME | End: 2023-11-30
Attending: EMERGENCY MEDICINE | Admitting: EMERGENCY MEDICINE
Payer: COMMERCIAL

## 2023-11-30 VITALS
OXYGEN SATURATION: 100 % | HEART RATE: 93 BPM | HEIGHT: 66 IN | TEMPERATURE: 98.3 F | DIASTOLIC BLOOD PRESSURE: 94 MMHG | RESPIRATION RATE: 20 BRPM | SYSTOLIC BLOOD PRESSURE: 144 MMHG | WEIGHT: 185 LBS | BODY MASS INDEX: 29.73 KG/M2

## 2023-11-30 DIAGNOSIS — S82.62XP: ICD-10-CM

## 2023-11-30 DIAGNOSIS — M79.605 BILATERAL LEG PAIN: ICD-10-CM

## 2023-11-30 DIAGNOSIS — M79.604 BILATERAL LEG PAIN: ICD-10-CM

## 2023-11-30 PROCEDURE — 99284 EMERGENCY DEPT VISIT MOD MDM: CPT

## 2023-11-30 PROCEDURE — 73610 X-RAY EXAM OF ANKLE: CPT | Mod: LT

## 2023-11-30 PROCEDURE — 250N000013 HC RX MED GY IP 250 OP 250 PS 637: Performed by: EMERGENCY MEDICINE

## 2023-11-30 RX ORDER — GABAPENTIN 100 MG/1
300 CAPSULE ORAL ONCE
Status: COMPLETED | OUTPATIENT
Start: 2023-11-30 | End: 2023-11-30

## 2023-11-30 RX ORDER — ACETAMINOPHEN 325 MG/1
650 TABLET ORAL ONCE
Status: COMPLETED | OUTPATIENT
Start: 2023-11-30 | End: 2023-11-30

## 2023-11-30 RX ORDER — GABAPENTIN 300 MG/1
300 CAPSULE ORAL 3 TIMES DAILY
Qty: 30 CAPSULE | Refills: 0 | Status: SHIPPED | OUTPATIENT
Start: 2023-11-30

## 2023-11-30 RX ORDER — NAPROXEN 250 MG/1
500 TABLET ORAL ONCE
Status: COMPLETED | OUTPATIENT
Start: 2023-11-30 | End: 2023-11-30

## 2023-11-30 RX ORDER — NAPROXEN 500 MG/1
500 TABLET ORAL 2 TIMES DAILY WITH MEALS
Qty: 24 TABLET | Refills: 0 | Status: SHIPPED | OUTPATIENT
Start: 2023-11-30

## 2023-11-30 RX ORDER — ACETAMINOPHEN 325 MG/1
650 TABLET ORAL EVERY 6 HOURS PRN
Qty: 30 TABLET | Refills: 0 | Status: SHIPPED | OUTPATIENT
Start: 2023-11-30

## 2023-11-30 RX ADMIN — NAPROXEN 500 MG: 250 TABLET ORAL at 18:41

## 2023-11-30 RX ADMIN — GABAPENTIN 300 MG: 100 CAPSULE ORAL at 18:42

## 2023-11-30 RX ADMIN — ACETAMINOPHEN 650 MG: 325 TABLET, FILM COATED ORAL at 18:41

## 2023-11-30 NOTE — ED PROVIDER NOTES
EMERGENCY DEPARTMENT ENCOUNTER      NAME: Rinku Jara  AGE: 48 year old male  YOB: 1975  MRN: [id number]  EVALUATION DATE & TIME: 2023  5:17 PM    PCP: No Ref-Primary, Physician    ED PROVIDER: Aquiles Bowen M.D.      Chief Complaint   Patient presents with    Leg Pain    Leg Injury         FINAL IMPRESSION:  1. Closed fracture of distal lateral malleolus of left fibula with malunion    2. Bilateral leg pain          ED COURSE & MEDICAL DECISION MAKIN year old male presents to the Emergency Department for evaluation of leg pain.  Patient has a history of a chronic distal fibula fracture sustained earlier this year.  He also has a history of diabetes.  Presents with pain in his bilateral legs distal to the knee especially focused on his left ankle at the site of the previous fracture.  He is ambulatory and nontoxic-appearing when he arrives to the emergency department.  Motor exam is symmetric and preserved.  No new specific trauma besides falling a few days ago and mildly injuring his right knee without any visible external trauma, normal weightbearing, and actually seems more focused on pain in his left ankle.  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  I think this could certainly be contributing to some of his pain but I doubt there would be much to do in the acute setting related to this.  I think he is still able to be ambulatory and weightbearing at this time.  He was given a dose of Tylenol, naproxen, gabapentin, and will be discharged with the same.  I do think some component of his burning and numbness in his legs might be related to something like a diabetic neuropathy given his history of poorly controlled diabetes in the past.  Reassuringly the patient has recently followed up to establish primary care and is planning to continue this as well as seeking input from a pain provider and possibly another  evaluation with orthopedics regarding his injury.  We discussed his x-ray results and the plan patient was comfortable with this.  Discharged in stable condition.    5:22 PM I met the patient and performed my initial interview and exam.     At the conclusion of the encounter I discussed the results of all of the tests and the disposition. The questions were answered. The patient or family acknowledged understanding and was agreeable with the care plan.       Medical Decision Making    History:  Supplemental history from: Documented in chart, if applicable  External Record(s) reviewed: Documented in chart, if applicable.    Work Up:  Chart documentation includes differential considered and any EKGs or imaging independently interpreted by provider, where specified.  In additional to work up documented, I considered the following work up: Documented in chart, if applicable.    External consultation:  Discussion of management with another provider: Documented in chart, if applicable    Complicating factors:  Care impacted by chronic illness: Diabetes and Hypertension  Care affected by social determinants of health: N/A    Disposition considerations: Discharge. I prescribed additional prescription strength medication(s) as charted. See documentation for any additional details.            MEDICATIONS GIVEN IN THE EMERGENCY:  Medications   acetaminophen (TYLENOL) tablet 650 mg (has no administration in time range)   naproxen (NAPROSYN) tablet 500 mg (has no administration in time range)   gabapentin (NEURONTIN) capsule 300 mg (has no administration in time range)       NEW PRESCRIPTIONS STARTED AT TODAY'S ER VISIT  New Prescriptions    ACETAMINOPHEN (TYLENOL) 325 MG TABLET    Take 2 tablets (650 mg) by mouth every 6 hours as needed for mild pain    DICLOFENAC (VOLTAREN) 1 % TOPICAL GEL    Apply 2 g topically 4 times daily    GABAPENTIN (NEURONTIN) 300 MG CAPSULE    Take 1 capsule (300 mg) by mouth 3 times daily    NAPROXEN  (NAPROSYN) 500 MG TABLET    Take 1 tablet (500 mg) by mouth 2 times daily (with meals)          =================================================================    HPI    Patient information was obtained from: patient    Use of : N/A       Rinku WILHELM Marek is a 48 year old male with a pertinent history of diabetes mellitus, HTN,  who presents to this ED via walk-in for evaluation of leg pain.    Since enduring an injury to his left ankle in March (about 8 months ago), patient has been experiencing pain, burning, itching, and numbness in his feet bilaterally, with the numbness and shooting pain being present in the shins/calves as well. The symptoms have been consistent over the past several months. He notes that he followed all instructions of medical professionals in order to heal his ankle, including going to follow-up appointments at [facility] and wearing a boot. Due to this, the pain in his left ankle is the most worrisome to him.    Patient fell on his right knee 5 days ago, but this did not modify the already present pain. Tylenol does not help for pain. He had no numbness, injury, or pain in his left ankle prior to the injury. The patient recently started blood pressure medication and metformin for HTN and diabetes mellitus, respectively.      REVIEW OF SYSTEMS   All systems reviewed and negative except as noted in HPI.    PAST MEDICAL HISTORY:  History reviewed. No pertinent past medical history.    PAST SURGICAL HISTORY:  Past Surgical History:   Procedure Laterality Date    REPAIR TENDON ACHILLES             CURRENT MEDICATIONS:    Current Facility-Administered Medications   Medication    acetaminophen (TYLENOL) tablet 650 mg    gabapentin (NEURONTIN) capsule 300 mg    naproxen (NAPROSYN) tablet 500 mg     Current Outpatient Medications   Medication    acetaminophen (TYLENOL) 325 MG tablet    diclofenac (VOLTAREN) 1 % topical gel    gabapentin (NEURONTIN) 300 MG capsule    naproxen (NAPROSYN)  "500 MG tablet    cholecalciferol, vitamin D3, 2,000 unit Tab    dextroamphetamine-amphetamine (ADDERALL) 20 mg Tab    dextroamphetamine-amphetamine (AMPHETAMINE-DEXTROAMPHETAMINE) 30 mg Tab    DULoxetine (CYMBALTA) 60 MG capsule    DULOXETINE HCL (CYMBALTA ORAL)    etodolac (LODINE) 300 MG capsule    metFORMIN (GLUCOPHAGE) 1000 MG tablet    methocarbamol (ROBAXIN) 500 MG tablet    methylPREDNISolone (MEDROL DOSEPAK) 4 MG tablet therapy pack    metoclopramide (REGLAN) 10 MG tablet    miscellaneous medical supply Mis    ondansetron (ZOFRAN ODT) 4 MG disintegrating tablet    traMADol (ULTRAM) 50 mg tablet    VITAMIN A/VITAMIN D3 (NATURAL VITAMIN D ORAL)         ALLERGIES:  No Known Allergies    FAMILY HISTORY:  History reviewed. No pertinent family history.    SOCIAL HISTORY:   Social History     Socioeconomic History    Marital status: Single   Tobacco Use    Smoking status: Former     Types: Cigarettes   Substance and Sexual Activity    Alcohol use: No    Drug use: No       VITALS:  BP (!) 140/97   Pulse 102   Temp 98.3  F (36.8  C) (Oral)   Resp 20   Ht 1.676 m (5' 6\")   Wt 83.9 kg (185 lb)   SpO2 100%   BMI 29.86 kg/m      PHYSICAL EXAM    Constitutional: Well developed, Well nourished, NAD.  HENT: Normocephalic, Atraumatic. Neck Supple.  Eyes: EOMI, Conjunctiva normal.  Respiratory: Breathing comfortably on room air. Speaks full sentences easily. Lungs clear to ascultation.  Cardiovascular: Normal heart rate, Regular rhythm. No peripheral edema.  Abdomen: Soft, nontender  Musculoskeletal: No visible external trauma or deformity of the bilateral lower extremities.  Normal range of motion of bilateral knees and ankles.  Left ankle is mildly diffusely tender without any palpable deformity and with mild asymmetric swelling compared to the right.  No warmth or overlying erythema.  Integument: Warm, Dry.  Neurologic: Alert & awake, Normal motor function, Normal sensory function, No focal deficits noted. "   Psychiatric: Cooperative. Affect appropriate.       RADIOLOGY:  Reviewed all pertinent imaging. Please see official radiology report.  XR Ankle Left G/E 3 Views   Final Result   IMPRESSION: Chronic nondisplaced fracture of the distal fibular metaphysis with an ununited cleft along the lateral margin, unchanged. 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 Calcaneal enthesophytes.    Thickening of the distal Achilles tendon with linear ossific densities at the anterior margin suggesting chronic tendinosis and/or sequela of remote trauma. No acute fracture.              I, Ag Sheehan, am serving as a scribe to document services personally performed by Dr. Aquiles Bowen, based on my observation and the provider's statements to me. I, Aquiles Bowen MD attest that Ag Sheehan is acting in a scribe capacity, has observed my performance of the services and has documented them in accordance with my direction.    Aquiles Bowen M.D.  Emergency Medicine  [facility] EMERGENCY DEPARTMENT  [address] [place] [id number] [phone]  Dept: [phone]       Aquiles Bowen MD  11/30/23 6410

## 2023-11-30 NOTE — ED TRIAGE NOTES
"Sharp shooting pain, itching, \"feels like water dripping down like when you get out of the shower\" in both legs over the past several months. Ibuprofen, tylenol not helping. States he has been here in the past. Reports hx of left ankle fx this past [facility] and concerned it has not healed fully, would like xray. Tripped and feel 3 days ago hitting right knee, painful.         "

## 2023-12-01 NOTE — DISCHARGE INSTRUCTIONS
You were seen in the emergency department for pain in your left ankle and both lower legs.  Your evaluation included an x-ray here which shows a chronic fracture with some nonunion which is stable from previous x-rays that we have from earlier this year.  We agree with your plan to follow-up with primary, pain clinic, and orthopedics.  We are going to prescribe you medications including Tylenol, Voltaren, gabapentin, and naproxen which we are hopeful will help with your pain.  Please take these as prescribed and try to follow-up in clinic after this ED visit to review ongoing concerns.

## 2023-12-19 ENCOUNTER — MEDICAL CORRESPONDENCE (OUTPATIENT)
Dept: HEALTH INFORMATION MANAGEMENT | Facility: CLINIC | Age: 48
End: 2023-12-19

## 2023-12-19 ENCOUNTER — TRANSCRIBE ORDERS (OUTPATIENT)
Dept: OTHER | Age: 48
End: 2023-12-19

## 2023-12-19 DIAGNOSIS — M79.2 NEUROPATHIC PAIN: Primary | ICD-10-CM

## 2024-04-19 ENCOUNTER — OFFICE VISIT (OUTPATIENT)
Dept: NEUROLOGY | Facility: CLINIC | Age: 49
End: 2024-04-19
Payer: COMMERCIAL

## 2024-04-19 VITALS — RESPIRATION RATE: 18 BRPM | SYSTOLIC BLOOD PRESSURE: 148 MMHG | HEART RATE: 94 BPM | DIASTOLIC BLOOD PRESSURE: 96 MMHG

## 2024-04-19 DIAGNOSIS — M79.2 NEUROPATHIC PAIN: ICD-10-CM

## 2024-04-19 DIAGNOSIS — G62.9 NEUROPATHY: Primary | ICD-10-CM

## 2024-04-19 PROCEDURE — 99205 OFFICE O/P NEW HI 60 MIN: CPT | Performed by: PSYCHIATRY & NEUROLOGY

## 2024-04-19 RX ORDER — LOSARTAN POTASSIUM 50 MG/1
50 TABLET ORAL
COMMUNITY
Start: 2023-09-22

## 2024-04-19 RX ORDER — ROSUVASTATIN CALCIUM 20 MG/1
TABLET, COATED ORAL
COMMUNITY
Start: 2023-10-24

## 2024-04-19 NOTE — LETTER
4/19/2024         RE: Rinku Jara  [address]  [place] [address]        Dear Colleague,    Thank you for referring your patient, Rinku Jara, to the [facility]UR[facility] [place]. Please see a copy of my visit note below.    NEUROLOGY OUTPATIENT CONSULT NOTE   Apr 19, 2024     CHIEF COMPLAINT/REASON FOR VISIT/REASON FOR CONSULT  Patient presents with:  NEUROPATHY    REASON FOR CONSULTATION-numbness/neuropathic pain/neuropathy    REFERRAL SOURCE  Dr. Saida Cash  CC Dr. Saida Cash    HISTORY OF PRESENT ILLNESS  Rinku Jara is a 48 year old male seen today for evaluation of numbness/neuropathic pain/neuropathy.  He reports that the symptoms have been going on for the last 1 year.  He had a right ankle fracture and symptoms have been going on since then.  Does report numbness in both feet also reports pins and needle sensation all over his body.  He denies any weakness or balance issues.  He did have back pain with some shooting pain down the legs.  Does have a diagnosis of prediabetes and is on metformin due to worsening numbers.  He also has a history of intermittent alcohol use.  He has plan to stop the alcohol at this point.  For his painful symptoms he is currently on gabapentin which helps a little but does not completely take care of his symptoms.  He is currently working with the pain clinic for his pain symptoms.    Previous history is reviewed and this is unchanged.    PAST MEDICAL/SURGICAL HISTORY  History reviewed. No pertinent past medical history.  Patient Active Problem List   Diagnosis     Back pain     Wart     Finger pain   Significant for diabetes, depression    FAMILY HISTORY  History reviewed. No pertinent family history.  Negative for neuropathy.    SOCIAL HISTORY  Social History     Tobacco Use     Smoking status: Former     Types: Cigarettes   Substance Use Topics     Alcohol use: No     Drug use: No       SYSTEMS REVIEW  Twelve-system ROS was  done and other than the HPI this was negative/positive for back pain, arm and leg pain, numbness/tingling, depression.    MEDICATIONS  Current Outpatient Medications   Medication Sig Dispense Refill     acetaminophen (TYLENOL) 325 MG tablet Take 2 tablets (650 mg) by mouth every 6 hours as needed for mild pain 30 tablet 0     diclofenac (VOLTAREN) 1 % topical gel Apply 2 g topically 4 times daily 50 g 0     DULoxetine (CYMBALTA) 60 MG capsule [DULOXETINE (CYMBALTA) 60 MG CAPSULE] TAKE 1 CAPSULE BY MOUTH ONCE DAILY. ALONG WITH 30MG CAP (TOTAL DOSE 90MG DAILY)       gabapentin (NEURONTIN) 300 MG capsule Take 1 capsule (300 mg) by mouth 3 times daily 30 capsule 0     losartan (COZAAR) 50 MG tablet Take 50 mg by mouth       metFORMIN (GLUCOPHAGE) 1000 MG tablet [METFORMIN (GLUCOPHAGE) 1000 MG TABLET] Take 1 tablet (1,000 mg total) by mouth 2 (two) times a day with meals. 30 tablet 0     methocarbamol (ROBAXIN) 500 MG tablet Take 1 tablet (500 mg) by mouth 4 times daily as needed for muscle spasms 40 tablet 0     methylPREDNISolone (MEDROL DOSEPAK) 4 MG tablet therapy pack Follow Package Directions 21 tablet 0     metoclopramide (REGLAN) 10 MG tablet [METOCLOPRAMIDE (REGLAN) 10 MG TABLET] Take 1 tablet (10 mg total) by mouth 2 (two) times a day as needed (at first symptom of headache). 10 tablet 0     naproxen (NAPROSYN) 500 MG tablet Take 1 tablet (500 mg) by mouth 2 times daily (with meals) 24 tablet 0     rosuvastatin (CRESTOR) 20 MG tablet 1 tablet Orally Once a day for 90 days       dextroamphetamine-amphetamine (ADDERALL) 20 mg Tab [DEXTROAMPHETAMINE-AMPHETAMINE (ADDERALL) 20 MG TAB] Take 20 mg by mouth. (Patient not taking: Reported on 4/19/2024)       dextroamphetamine-amphetamine (AMPHETAMINE-DEXTROAMPHETAMINE) 30 mg Tab [DEXTROAMPHETAMINE-AMPHETAMINE (AMPHETAMINE-DEXTROAMPHETAMINE) 30 MG TAB] Take 30 mg by mouth daily. (Patient not taking: Reported on 4/19/2024)       etodolac (LODINE) 300 MG capsule [ETODOLAC  (LODINE) 300 MG CAPSULE] Take 1 capsule (300 mg total) by mouth every 8 (eight) hours. Take with food (Patient not taking: Reported on 4/19/2024) 90 capsule 1     miscellaneous medical supply Misc [MISCELLANEOUS MEDICAL SUPPLY MISC] For home use: Pressure:7 cm H2O Length of Need: Lifetime       ondansetron (ZOFRAN ODT) 4 MG disintegrating tablet [ONDANSETRON (ZOFRAN ODT) 4 MG DISINTEGRATING TABLET] Take 1 tablet (4 mg total) by mouth every 8 (eight) hours as needed for nausea. (Patient not taking: Reported on 4/19/2024) 10 tablet 0     traMADol (ULTRAM) 50 mg tablet [TRAMADOL (ULTRAM) 50 MG TABLET] Take 50 mg by mouth. (Patient not taking: Reported on 4/19/2024)       No current facility-administered medications for this visit.        PHYSICAL EXAMINATION  VITALS: BP (!) 148/96   Pulse 94   Resp 18   GENERAL: Healthy appearing, alert, no acute distress, normal habitus.  CARDIOVASCULAR: Extremities warm and well perfused. Pulses present.   NEUROLOGICAL:  Patient is awake and oriented to self, place and time.  Attention span is normal.  Memory is grossly intact.  Language is fluent and follows commands appropriately.  Appropriate fund of knowledge. Cranial nerves 2-12 are intact. There is no pronator drift.  Motor exam shows 5/5 strength in all extremities.  Tone is symmetric bilaterally in upper and lower extremities.  Reflexes are symmetric and 1+/decreased in upper extremities and lower extremities. Sensory exam is grossly intact to light touch, pin prick and vibration.  Finger to nose and heel to shin is without dysmetria.  Romberg is negative.  Gait is normal and the patient is able to do tandem walk and walk on toes and heels with some difficulty.        DIAGNOSTICS  MRI L spine 2023      RELEVANT LABS  Component      Latest Ref Rng 3/4/2020  5:54 AM   WBC      4.0 - 11.0 thou/uL 6.7    RBC Count      4.40 - 6.20 mill/uL 4.98    Hemoglobin      14.0 - 18.0 g/dL 15.8    Hematocrit      40.0 - 54.0 % 44.9     MCV      80 - 100 fL 90    MCH      27.0 - 34.0 pg 31.7    MCHC      32.0 - 36.0 g/dL 35.2    RDW      11.0 - 14.5 % 12.7    Platelet Count      140 - 440 thou/uL 252    Mean Platelet Volume      8.5 - 12.5 fL 10.3    Bilirubin Total      0.0 - 1.0 mg/dL 0.4    Bilirubin Direct      <=0.5 mg/dL 0.1    Protein Total      6.0 - 8.0 g/dL 7.9    Albumin      3.5 - 5.0 g/dL 4.1    Alkaline Phosphatase      45 - 120 U/L 83    AST      0 - 40 U/L 11    ALT      0 - 45 U/L 21    Magnesium      1.8 - 2.6 mg/dL 1.9      [facility]      OUTSIDE RECORDS  Outside referral notes and chart notes were reviewed and pertinent information has been summarized (in addition to the HPI):-            IMPRESSION/REPORT/PLAN  Neuropathy  Neuropathic pain  History of diabetes/prediabetes  Rule out lumbar radiculopathy    This is a 48 year old male with numbness and tingling in his feet with generalized pins-and-needles neuropathic pain sensation.  MRI L-spine shows some degeneration and his symptoms could be related to lumbar radiculopathy.  He is currently working for his back pain with the pain clinic.  His EMG does show evidence of length dependent peripheral neuropathy.  The neuropathy could also be causing the numbness.  Discussed prognosis with the patient.  Will check blood work to look for other etiologies though could be related to his diabetes/prediabetes.    The generalized pain and prick sensation in the arms/legs could be related to his neuropathy though does not completely correlate with the severity of the EMG.  Could be nonneurological as well.  Recommend gabapentin, Lyrica, Cymbalta for pain management.  Currently he is working for his pain management with the pain clinic.    I can see him back on as-needed basis.  Continue follow-up with the pain clinic.    -     Vitamin B6; Future  -     Vitamin B12; Future  -     Vitamin B1 whole blood; Future  -     Protein Immunofixation Serum; Future  -     Protein  electrophoresis; Future  -     Hemoglobin A1c; Future  -     Copper level; Future  -     Lyme Disease Total Abs Bld with Reflex to Confirm CLIA; Future  -     Anti Nuclear Fara IgG by IFA with Reflex; Future    Return if symptoms worsen or fail to improve, for In-Clinic Visit (must).    Over 60 minutes were spent coordinating the care for the patient on the day of the encounter.  This includes previsit, during visit and post visit activities as documented above.  Reviewing outside records/chart.  Counseling patient.  Multiple problems reviewed/addressed.  Testing ordered.  (Activities include but not inclusive of reviewing chart, reviewing outside records, reviewing labs and imaging study results as well as the images, patient visit time including getting history and exam,  use if applicable, review of test results with the patient and coming up with a plan in a shared model, counseling patient and family, education and answering patient questions, EMR , EMR diagnosis entry and problem list management, medication reconciliation and prescription management if applicable, paperwork if applicable, printing documents and documentation of the visit activities.)        Chintan Garner MD  Neurologist  [facility]  Tel:- [phone]    This note was dictated using voice recognition software.  Any grammatical or context distortions are unintentional and inherent to the software.      Again, thank you for allowing me to participate in the care of your patient.        Sincerely,        Chintan Garner MD

## 2024-04-19 NOTE — PROGRESS NOTES
NEUROLOGY OUTPATIENT CONSULT NOTE   Apr 19, 2024     CHIEF COMPLAINT/REASON FOR VISIT/REASON FOR CONSULT  Patient presents with:  NEUROPATHY    REASON FOR CONSULTATION-numbness/neuropathic pain/neuropathy    REFERRAL SOURCE  Dr. Saida Cash   Dr. Saida Cash    HISTORY OF PRESENT ILLNESS  Rinku Jara is a 48 year old male seen today for evaluation of numbness/neuropathic pain/neuropathy.  He reports that the symptoms have been going on for the last 1 year.  He had a right ankle fracture and symptoms have been going on since then.  Does report numbness in both feet also reports pins and needle sensation all over his body.  He denies any weakness or balance issues.  He did have back pain with some shooting pain down the legs.  Does have a diagnosis of prediabetes and is on metformin due to worsening numbers.  He also has a history of intermittent alcohol use.  He has plan to stop the alcohol at this point.  For his painful symptoms he is currently on gabapentin which helps a little but does not completely take care of his symptoms.  He is currently working with the pain clinic for his pain symptoms.    Previous history is reviewed and this is unchanged.    PAST MEDICAL/SURGICAL HISTORY  History reviewed. No pertinent past medical history.  Patient Active Problem List   Diagnosis    Back pain    Wart    Finger pain   Significant for diabetes, depression    FAMILY HISTORY  History reviewed. No pertinent family history.  Negative for neuropathy.    SOCIAL HISTORY  Social History     Tobacco Use    Smoking status: Former     Types: Cigarettes   Substance Use Topics    Alcohol use: No    Drug use: No       SYSTEMS REVIEW  Twelve-system ROS was done and other than the HPI this was negative/positive for back pain, arm and leg pain, numbness/tingling, depression.    MEDICATIONS  Current Outpatient Medications   Medication Sig Dispense Refill    acetaminophen (TYLENOL) 325 MG tablet Take 2 tablets (650 mg) by mouth  every 6 hours as needed for mild pain 30 tablet 0    diclofenac (VOLTAREN) 1 % topical gel Apply 2 g topically 4 times daily 50 g 0    DULoxetine (CYMBALTA) 60 MG capsule [DULOXETINE (CYMBALTA) 60 MG CAPSULE] TAKE 1 CAPSULE BY MOUTH ONCE DAILY. ALONG WITH 30MG CAP (TOTAL DOSE 90MG DAILY)      gabapentin (NEURONTIN) 300 MG capsule Take 1 capsule (300 mg) by mouth 3 times daily 30 capsule 0    losartan (COZAAR) 50 MG tablet Take 50 mg by mouth      metFORMIN (GLUCOPHAGE) 1000 MG tablet [METFORMIN (GLUCOPHAGE) 1000 MG TABLET] Take 1 tablet (1,000 mg total) by mouth 2 (two) times a day with meals. 30 tablet 0    methocarbamol (ROBAXIN) 500 MG tablet Take 1 tablet (500 mg) by mouth 4 times daily as needed for muscle spasms 40 tablet 0    methylPREDNISolone (MEDROL DOSEPAK) 4 MG tablet therapy pack Follow Package Directions 21 tablet 0    metoclopramide (REGLAN) 10 MG tablet [METOCLOPRAMIDE (REGLAN) 10 MG TABLET] Take 1 tablet (10 mg total) by mouth 2 (two) times a day as needed (at first symptom of headache). 10 tablet 0    naproxen (NAPROSYN) 500 MG tablet Take 1 tablet (500 mg) by mouth 2 times daily (with meals) 24 tablet 0    rosuvastatin (CRESTOR) 20 MG tablet 1 tablet Orally Once a day for 90 days      dextroamphetamine-amphetamine (ADDERALL) 20 mg Tab [DEXTROAMPHETAMINE-AMPHETAMINE (ADDERALL) 20 MG TAB] Take 20 mg by mouth. (Patient not taking: Reported on 4/19/2024)      dextroamphetamine-amphetamine (AMPHETAMINE-DEXTROAMPHETAMINE) 30 mg Tab [DEXTROAMPHETAMINE-AMPHETAMINE (AMPHETAMINE-DEXTROAMPHETAMINE) 30 MG TAB] Take 30 mg by mouth daily. (Patient not taking: Reported on 4/19/2024)      etodolac (LODINE) 300 MG capsule [ETODOLAC (LODINE) 300 MG CAPSULE] Take 1 capsule (300 mg total) by mouth every 8 (eight) hours. Take with food (Patient not taking: Reported on 4/19/2024) 90 capsule 1    miscellaneous medical supply Misc [MISCELLANEOUS MEDICAL SUPPLY MISC] For home use: Pressure:7 cm H2O Length of Need:  Lifetime      ondansetron (ZOFRAN ODT) 4 MG disintegrating tablet [ONDANSETRON (ZOFRAN ODT) 4 MG DISINTEGRATING TABLET] Take 1 tablet (4 mg total) by mouth every 8 (eight) hours as needed for nausea. (Patient not taking: Reported on 4/19/2024) 10 tablet 0    traMADol (ULTRAM) 50 mg tablet [TRAMADOL (ULTRAM) 50 MG TABLET] Take 50 mg by mouth. (Patient not taking: Reported on 4/19/2024)       No current facility-administered medications for this visit.        PHYSICAL EXAMINATION  VITALS: BP (!) 148/96   Pulse 94   Resp 18   GENERAL: Healthy appearing, alert, no acute distress, normal habitus.  CARDIOVASCULAR: Extremities warm and well perfused. Pulses present.   NEUROLOGICAL:  Patient is awake and oriented to self, place and time.  Attention span is normal.  Memory is grossly intact.  Language is fluent and follows commands appropriately.  Appropriate fund of knowledge. Cranial nerves 2-12 are intact. There is no pronator drift.  Motor exam shows 5/5 strength in all extremities.  Tone is symmetric bilaterally in upper and lower extremities.  Reflexes are symmetric and 1+/decreased in upper extremities and lower extremities. Sensory exam is grossly intact to light touch, pin prick and vibration.  Finger to nose and heel to shin is without dysmetria.  Romberg is negative.  Gait is normal and the patient is able to do tandem walk and walk on toes and heels with some difficulty.        DIAGNOSTICS  MRI L spine 2023      RELEVANT LABS  Component      Latest Ref Rng 3/4/2020  5:54 AM   WBC      4.0 - 11.0 thou/uL 6.7    RBC Count      4.40 - 6.20 mill/uL 4.98    Hemoglobin      14.0 - 18.0 g/dL 15.8    Hematocrit      40.0 - 54.0 % 44.9    MCV      80 - 100 fL 90    MCH      27.0 - 34.0 pg 31.7    MCHC      32.0 - 36.0 g/dL 35.2    RDW      11.0 - 14.5 % 12.7    Platelet Count      140 - 440 thou/uL 252    Mean Platelet Volume      8.5 - 12.5 fL 10.3    Bilirubin Total      0.0 - 1.0 mg/dL 0.4    Bilirubin Direct       <=0.5 mg/dL 0.1    Protein Total      6.0 - 8.0 g/dL 7.9    Albumin      3.5 - 5.0 g/dL 4.1    Alkaline Phosphatase      45 - 120 U/L 83    AST      0 - 40 U/L 11    ALT      0 - 45 U/L 21    Magnesium      1.8 - 2.6 mg/dL 1.9      [facility]      OUTSIDE RECORDS  Outside referral notes and chart notes were reviewed and pertinent information has been summarized (in addition to the HPI):-            IMPRESSION/REPORT/PLAN  Neuropathy  Neuropathic pain  History of diabetes/prediabetes  Rule out lumbar radiculopathy    This is a 48 year old male with numbness and tingling in his feet with generalized pins-and-needles neuropathic pain sensation.  MRI L-spine shows some degeneration and his symptoms could be related to lumbar radiculopathy.  He is currently working for his back pain with the pain clinic.  His EMG does show evidence of length dependent peripheral neuropathy.  The neuropathy could also be causing the numbness.  Discussed prognosis with the patient.  Will check blood work to look for other etiologies though could be related to his diabetes/prediabetes.    The generalized pain and prick sensation in the arms/legs could be related to his neuropathy though does not completely correlate with the severity of the EMG.  Could be nonneurological as well.  Recommend gabapentin, Lyrica, Cymbalta for pain management.  Currently he is working for his pain management with the pain clinic.    I can see him back on as-needed basis.  Continue follow-up with the pain clinic.    -     Vitamin B6; Future  -     Vitamin B12; Future  -     Vitamin B1 whole blood; Future  -     Protein Immunofixation Serum; Future  -     Protein electrophoresis; Future  -     Hemoglobin A1c; Future  -     Copper level; Future  -     Lyme Disease Total Abs Bld with Reflex to Confirm CLIA; Future  -     Anti Nuclear Fara IgG by IFA with Reflex; Future    Return if symptoms worsen or fail to improve, for In-Clinic Visit (must).    Over  60 minutes were spent coordinating the care for the patient on the day of the encounter.  This includes previsit, during visit and post visit activities as documented above.  Reviewing outside records/chart.  Counseling patient.  Multiple problems reviewed/addressed.  Testing ordered.  (Activities include but not inclusive of reviewing chart, reviewing outside records, reviewing labs and imaging study results as well as the images, patient visit time including getting history and exam,  use if applicable, review of test results with the patient and coming up with a plan in a shared model, counseling patient and family, education and answering patient questions, EMR , EMR diagnosis entry and problem list management, medication reconciliation and prescription management if applicable, paperwork if applicable, printing documents and documentation of the visit activities.)        Chintan Garner MD  Neurologist  [facility]  Tel:- [phone]    This note was dictated using voice recognition software.  Any grammatical or context distortions are unintentional and inherent to the software.

## 2024-05-13 PROCEDURE — 99284 EMERGENCY DEPT VISIT MOD MDM: CPT

## 2024-05-13 ASSESSMENT — COLUMBIA-SUICIDE SEVERITY RATING SCALE - C-SSRS
6. HAVE YOU EVER DONE ANYTHING, STARTED TO DO ANYTHING, OR PREPARED TO DO ANYTHING TO END YOUR LIFE?: NO
2. HAVE YOU ACTUALLY HAD ANY THOUGHTS OF KILLING YOURSELF IN THE PAST MONTH?: NO
1. IN THE PAST MONTH, HAVE YOU WISHED YOU WERE DEAD OR WISHED YOU COULD GO TO SLEEP AND NOT WAKE UP?: NO

## 2024-05-14 ENCOUNTER — HOSPITAL ENCOUNTER (EMERGENCY)
Facility: HOSPITAL | Age: 49
Discharge: HOME | End: 2024-05-14
Attending: STUDENT IN AN ORGANIZED HEALTH CARE EDUCATION/TRAINING PROGRAM | Admitting: STUDENT IN AN ORGANIZED HEALTH CARE EDUCATION/TRAINING PROGRAM
Payer: COMMERCIAL

## 2024-05-14 VITALS
TEMPERATURE: 98.2 F | WEIGHT: 195.5 LBS | OXYGEN SATURATION: 98 % | SYSTOLIC BLOOD PRESSURE: 157 MMHG | DIASTOLIC BLOOD PRESSURE: 104 MMHG | HEART RATE: 89 BPM | BODY MASS INDEX: 31.55 KG/M2 | RESPIRATION RATE: 20 BRPM

## 2024-05-14 DIAGNOSIS — M54.50 LUMBAR BACK PAIN: ICD-10-CM

## 2024-05-14 PROCEDURE — 250N000013 HC RX MED GY IP 250 OP 250 PS 637: Performed by: STUDENT IN AN ORGANIZED HEALTH CARE EDUCATION/TRAINING PROGRAM

## 2024-05-14 PROCEDURE — 250N000012 HC RX MED GY IP 250 OP 636 PS 637: Performed by: STUDENT IN AN ORGANIZED HEALTH CARE EDUCATION/TRAINING PROGRAM

## 2024-05-14 PROCEDURE — 250N000011 HC RX IP 250 OP 636: Performed by: STUDENT IN AN ORGANIZED HEALTH CARE EDUCATION/TRAINING PROGRAM

## 2024-05-14 PROCEDURE — 96372 THER/PROPH/DIAG INJ SC/IM: CPT | Performed by: STUDENT IN AN ORGANIZED HEALTH CARE EDUCATION/TRAINING PROGRAM

## 2024-05-14 RX ORDER — OXYCODONE HYDROCHLORIDE 5 MG/1
5 TABLET ORAL ONCE
Status: COMPLETED | OUTPATIENT
Start: 2024-05-14 | End: 2024-05-14

## 2024-05-14 RX ORDER — PREDNISONE 20 MG/1
40 TABLET ORAL DAILY
Qty: 10 TABLET | Refills: 0 | Status: SHIPPED | OUTPATIENT
Start: 2024-05-14 | End: 2024-05-19

## 2024-05-14 RX ORDER — KETOROLAC TROMETHAMINE 15 MG/ML
15 INJECTION, SOLUTION INTRAMUSCULAR; INTRAVENOUS ONCE
Status: COMPLETED | OUTPATIENT
Start: 2024-05-14 | End: 2024-05-14

## 2024-05-14 RX ORDER — PREDNISONE 20 MG/1
60 TABLET ORAL ONCE
Status: COMPLETED | OUTPATIENT
Start: 2024-05-14 | End: 2024-05-14

## 2024-05-14 RX ADMIN — PREDNISONE 60 MG: 20 TABLET ORAL at 00:34

## 2024-05-14 RX ADMIN — KETOROLAC TROMETHAMINE 15 MG: 15 INJECTION, SOLUTION INTRAMUSCULAR; INTRAVENOUS at 00:35

## 2024-05-14 RX ADMIN — OXYCODONE HYDROCHLORIDE 5 MG: 5 TABLET ORAL at 00:34

## 2024-05-14 NOTE — DISCHARGE INSTRUCTIONS
Please continue to follow-up with your pain and spine center specialist.  Return for any bowel or bladder incontinence, leg weakness, numbness in your genital region or any other concerning symptoms.    Because you are starting steroids, make sure you are paying attention to your sugars as steroids may increase your sugar levels

## 2024-05-14 NOTE — ED TRIAGE NOTES
"Pt states low back pain radiating to \"all over\" for last 6 months.  States breaking his ankle about a year ago and has had problems ever since.  Taking tylenol and ibuprofen without relief     Triage Assessment (Adult)       Row Name 05/13/24 0629          Triage Assessment    Airway WDL WDL        Respiratory WDL    Respiratory WDL WDL        Skin Circulation/Temperature WDL    Skin Circulation/Temperature WDL WDL        Cardiac WDL    Cardiac WDL WDL        Peripheral/Neurovascular WDL    Peripheral Neurovascular WDL WDL        Cognitive/Neuro/Behavioral WDL    Cognitive/Neuro/Behavioral WDL WDL                     "

## 2024-05-14 NOTE — ED PROVIDER NOTES
Emergency Department Encounter         FINAL IMPRESSION:  Lumbar back pain        ED COURSE AND MEDICAL DECISION MAKING       ED Course as of 05/14/24 0033   Tue May 14, 2024   0025 I met with the patient to gather history and perform my exam. Plan for discharge discussed   0030 Patient is a 48-year-old male with a history of chronic pain syndrome due to back issues as well as abnormal EMGs, on multiple medications for nerve pain and pain at home, here with worsening lower back pain began this morning.  No fevers, chills, nausea vomiting.  No radiation of pain into his abdomen or gluteal cheeks.  No leg weakness, bowel or bladder incontinence or saddle anesthesia.  On arrival he looks well.  Vitals are stable.  Has no midline back pain, does have pain on the paraspinal musculature in the lower back.  Normal strength in lower extremities.  Normal sensation.  Normal pulses.  Will start some prednisone him.  Given 1 dose of oxycodone here as well as Toradol injection.  Recommended patient continue to follow-up with his primary care as well as pain clinic.  No other indication today that would suggest epidural abscess, discitis, cauda equina fracture, or any other morbidity or mortality causing process in his spine.                          Medical Decision Making  Obtained supplemental history:Supplemental history obtained?: No  Reviewed external records: External records reviewed?: Documented in chart and Outpatient Record: Neurology visit on 4/19/24  Care impacted by chronic illness:Chronic Pain  Care significantly affected by social determinants of health:N/A  Did you consider but not order tests?: Work up considered but not performed and documented in chart, if applicable  Did you interpret images independently?: Independent interpretation of ECG and images noted in documentation, when applicable.  Consultation discussion with other provider:Did you involve another provider (consultant, , pharmacy, etc.)?:  No  Discharge. I prescribed additional prescription strength medication(s) as charted. See documentation for any additional details.                  EKG:      I independently reviewed and interpreted the patient's EKG, with comments made as listed above.    Please see scanned EKG for full report.       Critical Care     Performed by: Cosmo Rivera or    Authorized by: Cosmo Rivera  Total critical care time:  minutes  Critical care was necessary to treat or prevent imminent or life-threatening deterioration of the following conditions:   Critical care was time spent personally by me on the following activities: development of treatment plan with patient or surrogate, discussions with consultants, examination of patient, evaluation of patient's response to treatment, obtaining history from patient or surrogate, ordering and performing treatments and interventions, ordering and review of laboratory studies, ordering and review of radiographic studies, re-evaluation of patient's condition and monitoring for potential decompensation.  Critical care time was exclusive of separately billable procedures and treating other patients.'        At the conclusion of the encounter I discussed the results of all the tests and the disposition. The questions were answered. The patient or family acknowledged understanding and was agreeable with the care plan.                  MEDICATIONS GIVEN IN THE EMERGENCY DEPARTMENT:  Medications   predniSONE (DELTASONE) tablet 60 mg (has no administration in time range)   ketorolac (TORADOL) injection 15 mg (has no administration in time range)   oxyCODONE (ROXICODONE) tablet 5 mg (has no administration in time range)       NEW PRESCRIPTIONS STARTED AT TODAY'S ED VISIT:  New Prescriptions    PREDNISONE (DELTASONE) 20 MG TABLET    Take 2 tablets (40 mg) by mouth daily for 5 days       HPI     Patient information obtained from: Patient     Use of Interpretor: N/A     Rinku WILHELM Marek is a 48 year old male  "with a pertinent history of back pain and neuropathy who presents to this ED via walk-in for evaluation of back pain    Per chart review: The patient was seen by neurology on 4/19/2024 for neuropathy.  Patient reports 1 year of numbness in both feet and pins and needle sensations.  Patient reports back pain with pain shooting down his legs.  Patient takes gabapentin for the pain, but is working with the pain clinic.  MRI lumbar spine in 2023 shows disc bulge with annular fissure L5-S1 and mild annular bulge at L4-L5.  Patient prescribed gabapentin, Lyrica, and Cymbalta for pain management.    Patient reports history of chronic back pain that \"just flares sometimes\".  Patient reports low back pain that worsened this morning.  Patient reports \"sharp pains\" in his low back that are \"shooting\" \"all over the place\" and down to his groin.  Patient states the pain is \"out of control\" and is not improving with ibuprofen, Lyrica, or gabapentin.  Patient reports he is following with a pain clinic, but they have not prescribed any pain medication yet.  The patient denies any falls or trauma.  The patient denies any loss of bowel or bladder, fevers, penis or groin numbness, leg weakness, abdominal pain, buttock pain, or any other symptoms or complaints.    Social history: The patient denies a history of IV drug use    MEDICAL HISTORY     No past medical history on file.    Past Surgical History:   Procedure Laterality Date    REPAIR TENDON ACHILLES         Social History     Tobacco Use    Smoking status: Former     Types: Cigarettes   Substance Use Topics    Alcohol use: No    Drug use: No       predniSONE (DELTASONE) 20 MG tablet  acetaminophen (TYLENOL) 325 MG tablet  dextroamphetamine-amphetamine (ADDERALL) 20 mg Tab  dextroamphetamine-amphetamine (AMPHETAMINE-DEXTROAMPHETAMINE) 30 mg Tab  diclofenac (VOLTAREN) 1 % topical gel  DULoxetine (CYMBALTA) 60 MG capsule  etodolac (LODINE) 300 MG capsule  gabapentin (NEURONTIN) 300 " MG capsule  losartan (COZAAR) 50 MG tablet  metFORMIN (GLUCOPHAGE) 1000 MG tablet  methocarbamol (ROBAXIN) 500 MG tablet  methylPREDNISolone (MEDROL DOSEPAK) 4 MG tablet therapy pack  metoclopramide (REGLAN) 10 MG tablet  miscellaneous medical supply Misc  naproxen (NAPROSYN) 500 MG tablet  ondansetron (ZOFRAN ODT) 4 MG disintegrating tablet  rosuvastatin (CRESTOR) 20 MG tablet  traMADol (ULTRAM) 50 mg tablet            PHYSICAL EXAM     BP (!) 162/103   Pulse 97   Temp 98.2  F (36.8  C) (Oral)   Resp 20   Wt 88.7 kg (195 lb 8 oz)   SpO2 99%   BMI 31.55 kg/m        PHYSICAL EXAM:     General: Patient appears well, nontoxic, comfortable  HEENT: Moist mucous membranes,  No head trauma.    Cardiovascular: Normal rate, normal rhythm, no extremity edema.  No appreciable murmur.   Normal lower extremity pulses  Respiratory: No signs of respiratory distress, lungs are clear to auscultation bilaterally with no wheezes rhonchi or rales.  Abdominal: Soft, nontender, nondistended, no palpable masses, no guarding, no rebound  Musculoskeletal: Full range of motion of joints, no deformities appreciated.  Tenderness to paraspinal musculature in the low back.  No midline back pain.  Normal strength in lower extremities.  Normal sensation.  Neurological: Alert and oriented, grossly neurologically intact.  Normal strength and sensation in lower extremities.  Psychological: Normal affect and mood.  Integument: No rashes appreciated    RESULTS       Labs Ordered and Resulted from Time of ED Arrival to Time of ED Departure - No data to display    No orders to display                     PROCEDURES:  Procedures:  Procedures       I, Marly Berrios am serving as a scribe to document services personally performed by Cosmo Rivera DO, based on my observations and the provider's statements to me.  I, Cosmo Rivera DO, attest that Marly Berrios is acting in a scribe capacity, has observed my performance of the services and has documented them in  accordance with my direction.    Cosmo Rivera DO  Emergency Medicine  [facility] EMERGENCY DEPARTMENT       Nicole, Cosmo Lugo DO  05/14/24 9040

## 2024-06-17 ENCOUNTER — TELEPHONE (OUTPATIENT)
Dept: NEUROLOGY | Facility: CLINIC | Age: 49
End: 2024-06-17
Payer: COMMERCIAL

## 2024-06-17 NOTE — TELEPHONE ENCOUNTER
[facility]    Phone Message    May a detailed message be left on voicemail: yes     Reason for Call: Requesting Results     Name/type of test: 4/19 Lab results    Date of test: 4/19- Pt unsure of date of labs    Was test done at a location other than [facility] (Please fill in the location if not [facility])?: No    Action Taken: Other: Neurology    Travel Screening: Not Applicable

## 2024-06-17 NOTE — TELEPHONE ENCOUNTER
Called and spoke with patient, who is seeking lab work from 4/19, stating they had some lab work completed through primary care (outside of [facility]).    Writer informed patient that labs ordered by Dr. Garner are still pended for patient to complete (and have not been sent to any other system to be completed).    Advised patient to go to any [facility] lab and they will complete the labs ordered by Dr. Garner.    Patient states understanding and will visit a lab to complete these lab order requests as needed.    Titi Vitale RN, BSN  [facility] Neurology

## 2024-09-23 ENCOUNTER — TRANSCRIBE ORDERS (OUTPATIENT)
Dept: PHYSICAL MEDICINE AND REHAB | Facility: CLINIC | Age: 49
End: 2024-09-23
Payer: COMMERCIAL

## 2024-09-23 DIAGNOSIS — M79.2 PAIN, NEUROPATHIC: Primary | ICD-10-CM

## 2024-12-10 ENCOUNTER — HOSPITAL ENCOUNTER (EMERGENCY)
Facility: HOSPITAL | Age: 49
Discharge: HOME | End: 2024-12-10
Attending: EMERGENCY MEDICINE | Admitting: EMERGENCY MEDICINE
Payer: COMMERCIAL

## 2024-12-10 ENCOUNTER — APPOINTMENT (OUTPATIENT)
Dept: RADIOLOGY | Facility: HOSPITAL | Age: 49
End: 2024-12-10
Attending: EMERGENCY MEDICINE
Payer: COMMERCIAL

## 2024-12-10 VITALS
RESPIRATION RATE: 20 BRPM | DIASTOLIC BLOOD PRESSURE: 60 MMHG | OXYGEN SATURATION: 98 % | WEIGHT: 195 LBS | HEART RATE: 95 BPM | BODY MASS INDEX: 31.34 KG/M2 | TEMPERATURE: 97.6 F | SYSTOLIC BLOOD PRESSURE: 111 MMHG | HEIGHT: 66 IN

## 2024-12-10 DIAGNOSIS — R20.0 NUMBNESS AND TINGLING OF BOTH FEET: ICD-10-CM

## 2024-12-10 DIAGNOSIS — R32 URINARY INCONTINENCE, UNSPECIFIED TYPE: ICD-10-CM

## 2024-12-10 DIAGNOSIS — R06.02 SHORTNESS OF BREATH: ICD-10-CM

## 2024-12-10 DIAGNOSIS — E87.6 HYPOKALEMIA: ICD-10-CM

## 2024-12-10 DIAGNOSIS — R20.2 NUMBNESS AND TINGLING OF BOTH FEET: ICD-10-CM

## 2024-12-10 DIAGNOSIS — R94.31 NONSPECIFIC ABNORMAL ELECTROCARDIOGRAM (ECG) (EKG): ICD-10-CM

## 2024-12-10 LAB
ALBUMIN UR-MCNC: NEGATIVE MG/DL
ANION GAP SERPL CALCULATED.3IONS-SCNC: 13 MMOL/L (ref 7–15)
APPEARANCE UR: CLEAR
BASOPHILS # BLD AUTO: 0 10E3/UL (ref 0–0.2)
BASOPHILS NFR BLD AUTO: 0 %
BILIRUB UR QL STRIP: NEGATIVE
BUN SERPL-MCNC: 12.3 MG/DL (ref 6–20)
CALCIUM SERPL-MCNC: 9.4 MG/DL (ref 8.8–10.4)
CHLORIDE SERPL-SCNC: 97 MMOL/L (ref 98–107)
COLOR UR AUTO: COLORLESS
CREAT SERPL-MCNC: 0.8 MG/DL (ref 0.67–1.17)
EGFRCR SERPLBLD CKD-EPI 2021: >90 ML/MIN/1.73M2
EOSINOPHIL # BLD AUTO: 0.1 10E3/UL (ref 0–0.7)
EOSINOPHIL NFR BLD AUTO: 2 %
ERYTHROCYTE [DISTWIDTH] IN BLOOD BY AUTOMATED COUNT: 12.1 % (ref 10–15)
GLUCOSE SERPL-MCNC: 138 MG/DL (ref 70–99)
GLUCOSE UR STRIP-MCNC: NEGATIVE MG/DL
HCO3 SERPL-SCNC: 27 MMOL/L (ref 22–29)
HCT VFR BLD AUTO: 40.7 % (ref 40–53)
HGB BLD-MCNC: 14.3 G/DL (ref 13.3–17.7)
HGB UR QL STRIP: NEGATIVE
IMM GRANULOCYTES # BLD: 0 10E3/UL
IMM GRANULOCYTES NFR BLD: 1 %
KETONES UR STRIP-MCNC: NEGATIVE MG/DL
LEUKOCYTE ESTERASE UR QL STRIP: NEGATIVE
LYMPHOCYTES # BLD AUTO: 1.6 10E3/UL (ref 0.8–5.3)
LYMPHOCYTES NFR BLD AUTO: 24 %
MCH RBC QN AUTO: 33.3 PG (ref 26.5–33)
MCHC RBC AUTO-ENTMCNC: 35.1 G/DL (ref 31.5–36.5)
MCV RBC AUTO: 95 FL (ref 78–100)
MONOCYTES # BLD AUTO: 1 10E3/UL (ref 0–1.3)
MONOCYTES NFR BLD AUTO: 14 %
MUCOUS THREADS #/AREA URNS LPF: PRESENT /LPF
NEUTROPHILS # BLD AUTO: 3.9 10E3/UL (ref 1.6–8.3)
NEUTROPHILS NFR BLD AUTO: 59 %
NITRATE UR QL: NEGATIVE
NRBC # BLD AUTO: 0 10E3/UL
NRBC BLD AUTO-RTO: 0 /100
NT-PROBNP SERPL-MCNC: 129 PG/ML (ref 0–450)
PH UR STRIP: 5.5 [PH] (ref 5–7)
PLATELET # BLD AUTO: 266 10E3/UL (ref 150–450)
POTASSIUM SERPL-SCNC: 3.2 MMOL/L (ref 3.4–5.3)
RBC # BLD AUTO: 4.29 10E6/UL (ref 4.4–5.9)
RBC URINE: 0 /HPF
SODIUM SERPL-SCNC: 137 MMOL/L (ref 135–145)
SP GR UR STRIP: 1.01 (ref 1–1.03)
UROBILINOGEN UR STRIP-MCNC: <2 MG/DL
WBC # BLD AUTO: 6.6 10E3/UL (ref 4–11)
WBC URINE: <1 /HPF

## 2024-12-10 PROCEDURE — 71046 X-RAY EXAM CHEST 2 VIEWS: CPT

## 2024-12-10 PROCEDURE — 99285 EMERGENCY DEPT VISIT HI MDM: CPT | Mod: 25 | Performed by: EMERGENCY MEDICINE

## 2024-12-10 PROCEDURE — 83880 ASSAY OF NATRIURETIC PEPTIDE: CPT | Performed by: EMERGENCY MEDICINE

## 2024-12-10 PROCEDURE — 36415 COLL VENOUS BLD VENIPUNCTURE: CPT | Performed by: EMERGENCY MEDICINE

## 2024-12-10 PROCEDURE — 81001 URINALYSIS AUTO W/SCOPE: CPT | Performed by: EMERGENCY MEDICINE

## 2024-12-10 PROCEDURE — 85014 HEMATOCRIT: CPT | Performed by: EMERGENCY MEDICINE

## 2024-12-10 PROCEDURE — 250N000013 HC RX MED GY IP 250 OP 250 PS 637: Performed by: EMERGENCY MEDICINE

## 2024-12-10 PROCEDURE — 93005 ELECTROCARDIOGRAM TRACING: CPT | Performed by: EMERGENCY MEDICINE

## 2024-12-10 PROCEDURE — 85004 AUTOMATED DIFF WBC COUNT: CPT | Performed by: EMERGENCY MEDICINE

## 2024-12-10 PROCEDURE — 80048 BASIC METABOLIC PNL TOTAL CA: CPT | Performed by: EMERGENCY MEDICINE

## 2024-12-10 PROCEDURE — 82310 ASSAY OF CALCIUM: CPT | Performed by: EMERGENCY MEDICINE

## 2024-12-10 RX ORDER — GABAPENTIN 100 MG/1
300 CAPSULE ORAL ONCE
Status: COMPLETED | OUTPATIENT
Start: 2024-12-10 | End: 2024-12-10

## 2024-12-10 RX ADMIN — GABAPENTIN 300 MG: 100 CAPSULE ORAL at 06:07

## 2024-12-10 ASSESSMENT — COLUMBIA-SUICIDE SEVERITY RATING SCALE - C-SSRS
1. IN THE PAST MONTH, HAVE YOU WISHED YOU WERE DEAD OR WISHED YOU COULD GO TO SLEEP AND NOT WAKE UP?: NO
2. HAVE YOU ACTUALLY HAD ANY THOUGHTS OF KILLING YOURSELF IN THE PAST MONTH?: NO
6. HAVE YOU EVER DONE ANYTHING, STARTED TO DO ANYTHING, OR PREPARED TO DO ANYTHING TO END YOUR LIFE?: NO

## 2024-12-10 ASSESSMENT — ACTIVITIES OF DAILY LIVING (ADL)
ADLS_ACUITY_SCORE: 41
ADLS_ACUITY_SCORE: 45

## 2024-12-10 NOTE — DISCHARGE INSTRUCTIONS
Call [facility] and let them know about your misunderstanding of the glucose monitor so that they can just reschedule your mri to be done within the next week as your clinic physician wanted.  Just remove the monitor before the procedure.    For your breathing, your lung xray just has a small amount of old scarring.  So, with wheezing the next step at your age should be to discuss with your clinic getting set up for a stress test for your heart.  I would recommend you get this scheduled and done within a month.  Be seen earlier as needed if your breathing worsens or you develop chest pains.  Also, you should stop all smoking, talk with your doctor about ways they can help.    For your neuropathy you can increase your gabapentin dose to 600mg in the am, 300mg at midday and 600mg at nighttime to see if that is more helpful as long as it doesn't make you too sleepy.  Let your doctor know about this change in dose.    You have tamsulosin (flomax) already prescribed and available if you decide to take that for your prostate but it can cause lower blood pressure as well.

## 2024-12-10 NOTE — Clinical Note
Rinku Marek was seen and treated in our emergency department on 12/10/2024.         Sincerely,     [facility] Emergency Department

## 2024-12-10 NOTE — ED PROVIDER NOTES
"  Emergency Department Encounter     Evaluation Date & Time:   12/10/2024  5:33 AM    CHIEF COMPLAINT:  body aches, diarrhea      Triage Note:Multiple complaints including body aches, trouble urinating, \"diabetic neuropathy pain. He has a glucose sensor. He also has back pain of unknown origin and is scheduled for an MRI. No interventions today. Pain is a 10. He also complains of diarrhea. He denies N/V, and is eating and drinking ok.     Triage Assessment (Adult)       Row Name 12/10/24 0528          Triage Assessment    Airway WDL WDL        Respiratory WDL    Respiratory WDL WDL        Skin Circulation/Temperature WDL    Skin Circulation/Temperature WDL WDL        Cardiac WDL    Cardiac WDL WDL        Peripheral/Neurovascular WDL    Peripheral Neurovascular WDL WDL        Cognitive/Neuro/Behavioral WDL    Cognitive/Neuro/Behavioral WDL WDL                         FINAL IMPRESSION:    ICD-10-CM    1. Shortness of breath  R06.02     intermittent, described as wheezing      2. Urinary incontinence, unspecified type  R32     urinary and occasional fecal nocturnally      3. Numbness and tingling of both feet  R20.0     R20.2       4. Hypokalemia  E87.6       5. Nonspecific abnormal electrocardiogram (ECG) (EKG)  R94.31           Impression and Plan     ED COURSE & MEDICAL DECISION MAKING:    He has multiple complaints.  In part he was curious about getting his MRI done here as he had missed it, but at this point there is no acute component to necessitate an emergent MRI here today.  His complaints of incontinence and lower back discomfort although can be concerning for cauda equina have been ongoing for the past 2 months and on my examination he has a normal gait and normal strength throughout his lower extremities as well as intact reflexes in his patella that are similar to his upper extremities.  So although this cannot be ruled out it is also not progressive and has been present as mention for a couple of months.  " Other etiology at this point is therefore more likely to cause his symptoms.    For his incontinence overnight of urine and the delay in start during the day suspect may be due to an enlarged prostate.  Again, prostate cancer cannot be ruled out in the emergency department and he should continue with plan for follow-up with his primary care for evaluation of his prostate.  He is taking medication for erectile dysfunction so would wait on starting him on Flomax until he talks with primary care he did state that they did blood work at his clinic for the concern of possible kidney dysfunction and I am reviewing his clinic note from December 3 which shows normal intact kidney function normal electrolytes as well as liver function test.  I did order these to be done today while waiting for the office visit to review, however, so we will get those to make sure that they are not changing with his ongoing symptoms.  Will also get urinalysis to see if he is spilling sugar in that which could contribute to his incontinence and frequency.    For his intermittent wheezing certainly is at risk for developing coronary artery disease.  No acute worsening again over the last few days but will get baseline chest x-ray and BNP as well as EKG to start the evaluation for developing coronary artery disease and on lung examination as he is a smoker we will see if there is any obvious mass or tumor.  He has some chronic appearing peripheral edema as well.  He does not have any acute onset to this to suggest infectious etiology or due to pulmonary embolus.    I also reviewed his medicatiosn from clinic.  He has an rx ready for him for tamsulosin for the urinary symptoms.  ED Course as of 12/10/24 0712   Tue Dec 10, 2024   0642 Labs reviewed and although potassium is slightly low, last check it was normal and he has no new meds/illness to account for it so will just encourage a potassium rich diet.  Would not cause symptoms.  Bnp not  elevated and cxr images and reading reviewed and I would agree, just a little scar tissue but no evidence of chf.  Still could have developing cad so with his risk factors recommend stress test and stoppign smoking for the scar tissue seen in the lungs.    Patient discharged with instructions to reach out to [facility] to get that mri rescheduled as his pmd ordered, and to talk with his pmd about also getting set up for a stress test given the wheezing intermittently.  No ua provided here but bs is not severely elevated and he has no new symptoms of uti so he can do that through pmd if they still feel it is needed.   0645 Advised he can try to increase his gabapentin dosing for his neuropathy        At the conclusion of the encounter I discussed the results of all the tests and the disposition. The questions were answered. The patient or family acknowledged understanding and was agreeable with the care plan.          0 minutes of critical care time        MEDICATIONS GIVEN IN THE EMERGENCY DEPARTMENT:  Medications   gabapentin (NEURONTIN) capsule 300 mg (300 mg Oral $Given 12/10/24 0607)       NEW PRESCRIPTIONS STARTED AT TODAY'S ED VISIT:  New Prescriptions    No medications on file       HPI     HPI     Rinku Jara is a 49 year old male with a pertinent history of diabetes who presents to this ED  for evaluation of wheezing, and incontinence.  He has multiple chronic medical problems.  He has been seeing his primary care physician at [facility] because of a 2-month history of incontinence overnight to where he will suddenly leak as he is sleeping, but otherwise has not been leaking during the day and has pare that with increasing chronic lower back pain.  Plan for that had been MRI but he missed the appointment a few days ago in part because he did not know that he could remove his diabetes monitor on his arm to get the MRI done.  Regardless, he has no new weakness in his extremities no new  numbness and no new incontinence over the past couple of months.  During the day he notes delay in his urine stream which he understands can be normal for people his age but he had not had problems with this until the last couple of months.    However, he has been intermittently wheezing and notes decreased exercise tolerance with this.  No fever, and does not feel that he has had a cough or cold with it.  He notes no prior lung problems.  He denies any chest pain associated with it.  He is a smoker but states he is never smoked cigarettes.  There is a smell of cigarette smoke on him so certainly may have secondhand smoke exposure as well as his underlying smoking.    He also notes chronic neuropathy pain in both of his feet which also has not changed over the last few months.  He was started on gabapentin and has been on 300 mg 3 times daily according to the patient for the last 2 months without any real improvement in his symptoms.    REVIEW OF SYSTEMS:  Review of Systems  remainder of systems are all otherwise negative.        Medical History     No past medical history on file.    Past Surgical History:   Procedure Laterality Date    REPAIR TENDON ACHILLES         No family history on file.    Social History     Tobacco Use    Smoking status: Former     Types: Cigarettes   Substance Use Topics    Alcohol use: No    Drug use: No       acetaminophen (TYLENOL) 325 MG tablet  dextroamphetamine-amphetamine (ADDERALL) 20 mg Tab  dextroamphetamine-amphetamine (AMPHETAMINE-DEXTROAMPHETAMINE) 30 mg Tab  diclofenac (VOLTAREN) 1 % topical gel  DULoxetine (CYMBALTA) 60 MG capsule  etodolac (LODINE) 300 MG capsule  gabapentin (NEURONTIN) 300 MG capsule  losartan (COZAAR) 50 MG tablet  metFORMIN (GLUCOPHAGE) 1000 MG tablet  methocarbamol (ROBAXIN) 500 MG tablet  methylPREDNISolone (MEDROL DOSEPAK) 4 MG tablet therapy pack  metoclopramide (REGLAN) 10 MG tablet  miscellaneous medical supply Misc  naproxen (NAPROSYN) 500 MG  "tablet  ondansetron (ZOFRAN ODT) 4 MG disintegrating tablet  rosuvastatin (CRESTOR) 20 MG tablet  traMADol (ULTRAM) 50 mg tablet        Physical Exam     First Vitals:  Patient Vitals for the past 24 hrs:   BP Temp Temp src Pulse Resp SpO2 Height Weight   12/10/24 0524 127/73 97.6  F (36.4  C) Temporal 101 20 98 % 1.676 m (5' 6\") 88.5 kg (195 lb)       PHYSICAL EXAM:   Constitutional:   patient in nad sitting up in a chair   HENT:  Normocephalic, posterior pharynx wnl, mucous membranes moist and dark pink   Eyes:  PERRL, EOMI, Conjunctiva normal, No discharge, no scleral icterus.  Respiratory:  Breathing easily, cta, ambulates across room without dyspnea or wheezing aas well  Cardiovascular:  rrr nl s1s2 0 murmurs, rubs, or gallops.  Peripheral pulses dp, pt, and radial are wnl.  Has 1-2 plus peripheral edema with chronic skin changes on anterior shins purplish in color  GI:  Bowel sounds normal, Soft, No tenderness, No flank tenderness, nondistended.  :No CVA tenderness.   Musculoskeletal:  Moves all extremities.  No erythematous or swollen major joints,   Integument:  normal color but has chronic stasis dermatitis changes on anterior shins.  Not diaphoretic.  Neurologic:  Alert & oriented x 3, Normal motor function, dtr 1 plus b patella and biceps.  Normal gait.  Able to stand briskly from chair with good hip strength.  Normal rapid turns. No focal deficits noted. Normal speech.  Patient wearing socks/shoes so ssensation not tested in feet.  Psychiatric:  Affect normal, Judgment normal, Mood normal.     Results     LAB AND RADIOLOGY:  All pertinent labs reviewed and interpreted  Results for orders placed or performed during the hospital encounter of 12/10/24   XR Chest 2 Views     Status: None    Narrative    EXAM: XR CHEST 2 VIEWS  LOCATION: [facility]  DATE: 12/10/2024    INDICATION: wheezing last couple of weeks.  smokes (but states not cigarettes)  COMPARISON: 3/4/2020.    FINDINGS: " The heart size is normal. Few bands of probable scar at the left lung base. The lungs are otherwise clear. No pneumothorax or pleural effusion.      Impression    IMPRESSION: No acute abnormality.   Basic metabolic panel     Status: Abnormal   Result Value Ref Range    Sodium 137 135 - 145 mmol/L    Potassium 3.2 (L) 3.4 - 5.3 mmol/L    Chloride 97 (L) 98 - 107 mmol/L    Carbon Dioxide (CO2) 27 22 - 29 mmol/L    Anion Gap 13 7 - 15 mmol/L    Urea Nitrogen 12.3 6.0 - 20.0 mg/dL    Creatinine 0.80 0.67 - 1.17 mg/dL    GFR Estimate >90 >60 mL/min/1.73m2    Calcium 9.4 8.8 - 10.4 mg/dL    Glucose 138 (H) 70 - 99 mg/dL   Nt probnp inpatient (BNP)     Status: Normal   Result Value Ref Range    N terminal Pro BNP Inpatient 129 0 - 450 pg/mL   UA with Microscopic reflex to Culture     Status: Abnormal    Specimen: Urine, Clean Catch   Result Value Ref Range    Color Urine Colorless Colorless, Straw, Light Yellow, Yellow    Appearance Urine Clear Clear    Glucose Urine Negative Negative mg/dL    Bilirubin Urine Negative Negative    Ketones Urine Negative Negative mg/dL    Specific Gravity Urine 1.006 1.001 - 1.030    Blood Urine Negative Negative    pH Urine 5.5 5.0 - 7.0    Protein Albumin Urine Negative Negative mg/dL    Urobilinogen Urine <2.0 <2.0 mg/dL    Nitrite Urine Negative Negative    Leukocyte Esterase Urine Negative Negative    Mucus Urine Present (A) None Seen /LPF    RBC Urine 0 <=2 /HPF    WBC Urine <1 <=5 /HPF    Narrative    Urine Culture not indicated   CBC with platelets and differential     Status: Abnormal   Result Value Ref Range    WBC Count 6.6 4.0 - 11.0 10e3/uL    RBC Count 4.29 (L) 4.40 - 5.90 10e6/uL    Hemoglobin 14.3 13.3 - 17.7 g/dL    Hematocrit 40.7 40.0 - 53.0 %    MCV 95 78 - 100 fL    MCH 33.3 (H) 26.5 - 33.0 pg    MCHC 35.1 31.5 - 36.5 g/dL    RDW 12.1 10.0 - 15.0 %    Platelet Count 266 150 - 450 10e3/uL    % Neutrophils 59 %    % Lymphocytes 24 %    % Monocytes 14 %    % Eosinophils 2 %     % Basophils 0 %    % Immature Granulocytes 1 %    NRBCs per 100 WBC 0 <1 /100    Absolute Neutrophils 3.9 1.6 - 8.3 10e3/uL    Absolute Lymphocytes 1.6 0.8 - 5.3 10e3/uL    Absolute Monocytes 1.0 0.0 - 1.3 10e3/uL    Absolute Eosinophils 0.1 0.0 - 0.7 10e3/uL    Absolute Basophils 0.0 0.0 - 0.2 10e3/uL    Absolute Immature Granulocytes 0.0 <=0.4 10e3/uL    Absolute NRBCs 0.0 10e3/uL   CBC with platelets differential     Status: Abnormal    Narrative    The following orders were created for panel order CBC with platelets differential.  Procedure                               Abnormality         Status                     ---------                               -----------         ------                     CBC with platelets and d...[[id number]]  Abnormal            Final result                 Please view results for these tests on the individual orders.         ECG:    Performed at: 0708    Impression: nsr rate of 96, possible inferior infarct age undetermined, borderline findings as not quite 1 box wide for q waves in iii and avf.  No acute st findings.  New compared to 3/4/20.  Pr 150ms, qrs 74ms, qtc 442ms, prt axes 72 46 50.    I have independently reviewed and interpreted the EKS(s) documented above    PROCEDURES:  Procedures:      [facility] Documentation     Medical Decision Making  Obtained supplemental history:Supplemental history obtained?: No  Reviewed external records: External records reviewed?: Documented in chart  Care impacted by chronic illness:Diabetes  Did you consider but not order tests?: Work up considered but not performed and documented in chart, if applicable  Did you interpret images independently?: Independent interpretation of ECG and images noted in documentation, when applicable.  Consultation discussion with other provider:Did you involve another provider (consultant, , pharmacy, etc.)?: I discussed the care with another health care provider, see documentation for  details.  Admission considered. Patient was signed out to the oncoming physician, disposition pending.    Joanie Sanchez MD  Emergency Medicine  [facility] EMERGENCY DEPARTMENT         Joanie Sanchez MD  12/10/24 0749

## 2024-12-10 NOTE — Clinical Note
Rinku Marek was seen and treated in our emergency department on 12/10/2024.         Sincerely,     [facility] Emergency Department

## 2024-12-10 NOTE — ED TRIAGE NOTES
"Multiple complaints including body aches, trouble urinating, \"diabetic neuropathy pain. He has a glucose sensor. He also has back pain of unknown origin and is scheduled for an MRI. No interventions today. Pain is a 10. He also complains of diarrhea. He denies N/V, and is eating and drinking ok.     Triage Assessment (Adult)       Row Name 12/10/24 0528          Triage Assessment    Airway WDL WDL        Respiratory WDL    Respiratory WDL WDL        Skin Circulation/Temperature WDL    Skin Circulation/Temperature WDL WDL        Cardiac WDL    Cardiac WDL WDL        Peripheral/Neurovascular WDL    Peripheral Neurovascular WDL WDL        Cognitive/Neuro/Behavioral WDL    Cognitive/Neuro/Behavioral WDL WDL                     "

## 2025-01-22 ENCOUNTER — HOSPITAL ENCOUNTER (EMERGENCY)
Facility: HOSPITAL | Age: 50
Discharge: HOME | End: 2025-01-23
Attending: STUDENT IN AN ORGANIZED HEALTH CARE EDUCATION/TRAINING PROGRAM
Payer: COMMERCIAL

## 2025-01-22 VITALS
TEMPERATURE: 98.3 F | BODY MASS INDEX: 31.47 KG/M2 | SYSTOLIC BLOOD PRESSURE: 130 MMHG | HEART RATE: 89 BPM | WEIGHT: 195 LBS | RESPIRATION RATE: 22 BRPM | DIASTOLIC BLOOD PRESSURE: 78 MMHG | OXYGEN SATURATION: 95 %

## 2025-01-22 DIAGNOSIS — J18.9 PNEUMONIA OF BOTH LUNGS DUE TO INFECTIOUS ORGANISM, UNSPECIFIED PART OF LUNG: ICD-10-CM

## 2025-01-22 DIAGNOSIS — J10.1 INFLUENZA A: ICD-10-CM

## 2025-01-22 PROCEDURE — 87637 SARSCOV2&INF A&B&RSV AMP PRB: CPT | Performed by: EMERGENCY MEDICINE

## 2025-01-22 PROCEDURE — 99284 EMERGENCY DEPT VISIT MOD MDM: CPT | Mod: 25

## 2025-01-22 ASSESSMENT — COLUMBIA-SUICIDE SEVERITY RATING SCALE - C-SSRS
1. IN THE PAST MONTH, HAVE YOU WISHED YOU WERE DEAD OR WISHED YOU COULD GO TO SLEEP AND NOT WAKE UP?: NO
6. HAVE YOU EVER DONE ANYTHING, STARTED TO DO ANYTHING, OR PREPARED TO DO ANYTHING TO END YOUR LIFE?: NO
2. HAVE YOU ACTUALLY HAD ANY THOUGHTS OF KILLING YOURSELF IN THE PAST MONTH?: NO

## 2025-01-22 NOTE — Clinical Note
Rinku Jara was seen and treated in our emergency department on 1/22/2025.  He may return to work on 01/25/2025.       If you have any questions or concerns, please don't hesitate to call.      Luigi Mendieta MD

## 2025-01-23 ENCOUNTER — APPOINTMENT (OUTPATIENT)
Dept: RADIOLOGY | Facility: HOSPITAL | Age: 50
End: 2025-01-23
Attending: STUDENT IN AN ORGANIZED HEALTH CARE EDUCATION/TRAINING PROGRAM
Payer: COMMERCIAL

## 2025-01-23 LAB
FLUAV RNA SPEC QL NAA+PROBE: POSITIVE
FLUBV RNA RESP QL NAA+PROBE: NEGATIVE
RSV RNA SPEC NAA+PROBE: NEGATIVE
SARS-COV-2 RNA RESP QL NAA+PROBE: NEGATIVE

## 2025-01-23 PROCEDURE — 250N000013 HC RX MED GY IP 250 OP 250 PS 637: Performed by: STUDENT IN AN ORGANIZED HEALTH CARE EDUCATION/TRAINING PROGRAM

## 2025-01-23 PROCEDURE — 71046 X-RAY EXAM CHEST 2 VIEWS: CPT

## 2025-01-23 PROCEDURE — 250N000011 HC RX IP 250 OP 636: Performed by: STUDENT IN AN ORGANIZED HEALTH CARE EDUCATION/TRAINING PROGRAM

## 2025-01-23 RX ORDER — BENZONATATE 100 MG/1
100 CAPSULE ORAL ONCE
Status: COMPLETED | OUTPATIENT
Start: 2025-01-23 | End: 2025-01-23

## 2025-01-23 RX ORDER — IBUPROFEN 600 MG/1
600 TABLET, FILM COATED ORAL ONCE
Status: COMPLETED | OUTPATIENT
Start: 2025-01-23 | End: 2025-01-23

## 2025-01-23 RX ORDER — AZITHROMYCIN 250 MG/1
TABLET, FILM COATED ORAL
Qty: 6 TABLET | Refills: 0 | Status: SHIPPED | OUTPATIENT
Start: 2025-01-23 | End: 2025-01-28

## 2025-01-23 RX ORDER — ONDANSETRON 4 MG/1
4 TABLET, ORALLY DISINTEGRATING ORAL ONCE
Status: COMPLETED | OUTPATIENT
Start: 2025-01-23 | End: 2025-01-23

## 2025-01-23 RX ORDER — BENZONATATE 100 MG/1
100 CAPSULE ORAL 3 TIMES DAILY PRN
Qty: 15 CAPSULE | Refills: 0 | Status: SHIPPED | OUTPATIENT
Start: 2025-01-23 | End: 2025-01-28

## 2025-01-23 RX ORDER — ONDANSETRON 4 MG/1
4 TABLET, ORALLY DISINTEGRATING ORAL EVERY 8 HOURS PRN
Qty: 15 TABLET | Refills: 0 | Status: SHIPPED | OUTPATIENT
Start: 2025-01-23 | End: 2025-01-28

## 2025-01-23 RX ADMIN — BENZONATATE 100 MG: 100 CAPSULE ORAL at 00:17

## 2025-01-23 RX ADMIN — ONDANSETRON 4 MG: 4 TABLET, ORALLY DISINTEGRATING ORAL at 00:16

## 2025-01-23 RX ADMIN — IBUPROFEN 600 MG: 600 TABLET ORAL at 00:18

## 2025-01-23 ASSESSMENT — ACTIVITIES OF DAILY LIVING (ADL): ADLS_ACUITY_SCORE: 41

## 2025-01-23 NOTE — ED TRIAGE NOTES
Pt has been ill feeling since Friday with body aches, nausea, diarrhea, headache, cough, back pain.      Triage Assessment (Adult)       Row Name 01/22/25 0267          Triage Assessment    Airway WDL WDL        Respiratory WDL    Respiratory WDL cough        Skin Circulation/Temperature WDL    Skin Circulation/Temperature WDL WDL        Cardiac WDL    Cardiac WDL WDL        Peripheral/Neurovascular WDL    Peripheral Neurovascular WDL WDL        Cognitive/Neuro/Behavioral WDL    Cognitive/Neuro/Behavioral WDL WDL

## 2025-01-23 NOTE — DISCHARGE INSTRUCTIONS
Continue to treat your symptoms using ibuprofen over-the-counter 600 mg every 6 hours.  You may also use the prescribed Tessalon Perles to help reduce your cough, in addition to the prescribed Zofran for any nauseousness. Antibiotics prescribed for your pneumonia (bacterial infection that is on top of your viral illness)    Please return to the ER if symptoms worsen

## 2025-01-23 NOTE — ED PROVIDER NOTES
EMERGENCY DEPARTMENT ENCOUNTER      NAME: Rinku Jara  AGE: 49 year old male  YOB: 1975  MRN: [id number]  EVALUATION DATE & TIME: 1/22/2025 11:36 PM    PCP: Saida Cash    ED PROVIDER: Luigi Mendieta MD      Chief Complaint   Patient presents with    Flu Symptoms         FINAL IMPRESSION:  1. Influenza A    2. Pneumonia of both lungs due to infectious organism, unspecified part of lung          ED COURSE & MEDICAL DECISION MAKING:    Pertinent Labs & Imaging studies reviewed. (See chart for details)  49 year old male presents to the Emergency Department for evaluation of flu sxs.    Patient has had approximately 4 days of feeling unwell.  Symptoms include body aches, nausea, diarrhea, headache, productive cough.  He has not tried anything for his symptoms prior to coming in.  He has no underlying lung disease.  On exam although he does appear uncomfortable, his vitals are normal, he is in no acute distress.  Symptoms are most consistent with a viral illness.  However, given the duration of symptoms, productive cough, and rhonchi heard on exam in both lung fields, will obtain chest x-ray to evaluate for bacterial pneumonia.  Patient has a prior allergy to Tylenol, so he was given ibuprofen and Tessalon Perles.    ED Course as of 01/23/25 0101   Thu Jan 23, 2025   0018 Patient is influenza A positive.  Given duration of symptoms, is not a candidate for Tamiflu.   0049 Patient has bilateral infiltrates on chest x-ray.  Will prescribe antibiotics for community-acquired pneumonia.       Medical Decision Making  Obtained supplemental history:Supplemental history obtained?: No  Reviewed external records: External records reviewed?: No  Care impacted by chronic illness:Documented in Chart  Care significantly affected by social determinants of health:N/A  Did you consider but not order tests?: Work up considered but not performed and documented in chart, if applicable  Did you interpret images  independently?: Independent interpretation of ECG and images noted in documentation, when applicable.  Consultation discussion with other provider:Did you involve another provider (consultant, , pharmacy, etc.)?: No  Discharge. I prescribed additional prescription strength medication(s) as charted. See documentation for any additional details.  Not Applicable      At the conclusion of the encounter I discussed the results of all of the tests and the disposition. The questions were answered. The patient or family acknowledged understanding and was agreeable with the care plan.     0 minutes of critical care time     MEDICATIONS GIVEN IN THE EMERGENCY:  Medications   ibuprofen (ADVIL/MOTRIN) tablet 600 mg (600 mg Oral $Given 1/23/25 0018)   benzonatate (TESSALON) capsule 100 mg (100 mg Oral $Given 1/23/25 0017)   ondansetron (ZOFRAN ODT) ODT tab 4 mg (4 mg Oral $Given 1/23/25 0016)       NEW PRESCRIPTIONS STARTED AT TODAY'S ER VISIT  New Prescriptions    AZITHROMYCIN (ZITHROMAX) 250 MG TABLET    Take 2 tablets (500 mg) by mouth daily for 1 day, THEN 1 tablet (250 mg) daily for 4 days.    BENZONATATE (TESSALON) 100 MG CAPSULE    Take 1 capsule (100 mg) by mouth 3 times daily as needed for cough.    ONDANSETRON (ZOFRAN ODT) 4 MG ODT TAB    Take 1 tablet (4 mg) by mouth every 8 hours as needed for nausea or vomiting.          =================================================================    HPI    Patient information was obtained from: patient    Use of : N/A        Rinku WILHELM Marek is a 49 year old male with a pertinent history of T2DM who presents to this ED for evaluation of flulike symptoms.  For the past 4 days he has been having generalized bodyaches, productive cough, fever, headache, nauseousness, diarrhea.  He has not tried anything for his symptoms other than trying to drink more water.  He denies hemoptysis, abdominal pain.  Denies rashes.  Denies neck stiffness or rigidity.  Denies chest  pain.      PAST MEDICAL HISTORY:  No past medical history on file.    PAST SURGICAL HISTORY:  Past Surgical History:   Procedure Laterality Date    REPAIR TENDON ACHILLES             CURRENT MEDICATIONS:    azithromycin (ZITHROMAX) 250 MG tablet  acetaminophen (TYLENOL) 325 MG tablet  benzonatate (TESSALON) 100 MG capsule  dextroamphetamine-amphetamine (ADDERALL) 20 mg Tab  dextroamphetamine-amphetamine (AMPHETAMINE-DEXTROAMPHETAMINE) 30 mg Tab  diclofenac (VOLTAREN) 1 % topical gel  DULoxetine (CYMBALTA) 60 MG capsule  etodolac (LODINE) 300 MG capsule  gabapentin (NEURONTIN) 300 MG capsule  losartan (COZAAR) 50 MG tablet  metFORMIN (GLUCOPHAGE) 1000 MG tablet  methocarbamol (ROBAXIN) 500 MG tablet  methylPREDNISolone (MEDROL DOSEPAK) 4 MG tablet therapy pack  metoclopramide (REGLAN) 10 MG tablet  miscellaneous medical supply Misc  naproxen (NAPROSYN) 500 MG tablet  ondansetron (ZOFRAN ODT) 4 MG ODT tab  rosuvastatin (CRESTOR) 20 MG tablet  traMADol (ULTRAM) 50 mg tablet        ALLERGIES:  Allergies   Allergen Reactions    Acetaminophen Headache and Other (See Comments)    Propoxyphene Headache       FAMILY HISTORY:  No family history on file.    SOCIAL HISTORY:   Social History     Socioeconomic History    Marital status: Single   Tobacco Use    Smoking status: Former     Types: Cigarettes   Substance and Sexual Activity    Alcohol use: No    Drug use: No     Social Drivers of Health      Received from [facility]    Social [employer] Data       VITALS:  /78   Pulse 89   Temp 98.3  F (36.8  C) (Oral)   Resp 22   Wt 88.5 kg (195 lb)   SpO2 95%   BMI 31.47 kg/m      PHYSICAL EXAM    Physical Exam  Vitals and nursing note reviewed.   Constitutional:       General: He is not in acute distress.     Appearance: Normal appearance. He is normal weight. He is not ill-appearing.   HENT:      Head: Normocephalic and atraumatic.      Nose: Nose normal.      Mouth/Throat:      Mouth:  Mucous membranes are moist.      Pharynx: Oropharynx is clear.   Eyes:      Extraocular Movements: Extraocular movements intact.      Conjunctiva/sclera: Conjunctivae normal.   Cardiovascular:      Rate and Rhythm: Normal rate and regular rhythm.      Pulses: Normal pulses.      Heart sounds: Normal heart sounds. No murmur heard.  Pulmonary:      Effort: Pulmonary effort is normal. No respiratory distress.      Breath sounds: Rhonchi present.   Abdominal:      General: Abdomen is flat. There is no distension.      Palpations: Abdomen is soft.      Tenderness: There is no abdominal tenderness.   Musculoskeletal:         General: Normal range of motion.      Cervical back: Normal range of motion.      Right lower leg: No edema.      Left lower leg: No edema.   Skin:     General: Skin is warm and dry.      Capillary Refill: Capillary refill takes less than 2 seconds.      Findings: No rash.   Neurological:      General: No focal deficit present.      Mental Status: He is alert and oriented to person, place, and time. Mental status is at baseline.   Psychiatric:         Mood and Affect: Mood normal.         Behavior: Behavior normal.         Thought Content: Thought content normal.         Judgment: Judgment normal.            LAB:  All pertinent labs reviewed and interpreted.  Results for orders placed or performed during the hospital encounter of 01/22/25   XR Chest 2 Views    Impression    IMPRESSION: Cardiomediastinal silhouette stable. Right mid to lower lung infiltrate. Mild left basilar infiltrate.   Influenza A/B, RSV and SARS-CoV2 PCR (COVID-19) Nasopharyngeal    Specimen: Nasopharyngeal; Swab   Result Value Ref Range    Influenza A PCR Positive (A) Negative    Influenza B PCR Negative Negative    RSV PCR Negative Negative    SARS CoV2 PCR Negative Negative       RADIOLOGY:  Reviewed all pertinent imaging. Please see official radiology report.  XR Chest 2 Views   Final Result   IMPRESSION: Cardiomediastinal  silhouette stable. Right mid to lower lung infiltrate. Mild left basilar infiltrate.          PROCEDURES:   None      [facility] Documentation:   CMS Diagnoses:                Luigi Mendieta MD  [facility] EMERGENCY DEPARTMENT  [facility] [facility] [address]  [phone]       Luigi Mendieta MD  01/23/25 0101

## 2025-02-13 ENCOUNTER — HOSPITAL ENCOUNTER (EMERGENCY)
Facility: HOSPITAL | Age: 50
Discharge: HOME | End: 2025-02-13
Attending: STUDENT IN AN ORGANIZED HEALTH CARE EDUCATION/TRAINING PROGRAM
Payer: COMMERCIAL

## 2025-02-13 ENCOUNTER — PATIENT OUTREACH (OUTPATIENT)
Dept: SURGERY | Facility: CLINIC | Age: 50
End: 2025-02-13

## 2025-02-13 ENCOUNTER — APPOINTMENT (OUTPATIENT)
Dept: CT IMAGING | Facility: HOSPITAL | Age: 50
End: 2025-02-13
Attending: STUDENT IN AN ORGANIZED HEALTH CARE EDUCATION/TRAINING PROGRAM
Payer: COMMERCIAL

## 2025-02-13 VITALS
HEART RATE: 87 BPM | DIASTOLIC BLOOD PRESSURE: 87 MMHG | RESPIRATION RATE: 18 BRPM | TEMPERATURE: 98.8 F | WEIGHT: 202.38 LBS | BODY MASS INDEX: 32.53 KG/M2 | OXYGEN SATURATION: 99 % | HEIGHT: 66 IN | SYSTOLIC BLOOD PRESSURE: 157 MMHG

## 2025-02-13 DIAGNOSIS — K61.0 PERIANAL ABSCESS: ICD-10-CM

## 2025-02-13 LAB
ALBUMIN SERPL BCG-MCNC: 4.2 G/DL (ref 3.5–5.2)
ALP SERPL-CCNC: 144 U/L (ref 40–150)
ALT SERPL W P-5'-P-CCNC: 12 U/L (ref 0–70)
ANION GAP SERPL CALCULATED.3IONS-SCNC: 12 MMOL/L (ref 7–15)
AST SERPL W P-5'-P-CCNC: 15 U/L (ref 0–45)
BASOPHILS # BLD AUTO: 0 10E3/UL (ref 0–0.2)
BASOPHILS NFR BLD AUTO: 0 %
BILIRUB DIRECT SERPL-MCNC: <0.2 MG/DL (ref 0–0.3)
BILIRUB SERPL-MCNC: 0.2 MG/DL
BUN SERPL-MCNC: 8.8 MG/DL (ref 6–20)
CALCIUM SERPL-MCNC: 10.4 MG/DL (ref 8.8–10.4)
CHLORIDE SERPL-SCNC: 96 MMOL/L (ref 98–107)
CREAT SERPL-MCNC: 0.95 MG/DL (ref 0.67–1.17)
EGFRCR SERPLBLD CKD-EPI 2021: >90 ML/MIN/1.73M2
EOSINOPHIL # BLD AUTO: 0.1 10E3/UL (ref 0–0.7)
EOSINOPHIL NFR BLD AUTO: 1 %
ERYTHROCYTE [DISTWIDTH] IN BLOOD BY AUTOMATED COUNT: 13 % (ref 10–15)
GLUCOSE SERPL-MCNC: 303 MG/DL (ref 70–99)
HCO3 SERPL-SCNC: 28 MMOL/L (ref 22–29)
HCT VFR BLD AUTO: 41.4 % (ref 40–53)
HGB BLD-MCNC: 14 G/DL (ref 13.3–17.7)
IMM GRANULOCYTES # BLD: 0.1 10E3/UL
IMM GRANULOCYTES NFR BLD: 1 %
LYMPHOCYTES # BLD AUTO: 2.3 10E3/UL (ref 0.8–5.3)
LYMPHOCYTES NFR BLD AUTO: 26 %
MCH RBC QN AUTO: 32.5 PG (ref 26.5–33)
MCHC RBC AUTO-ENTMCNC: 33.8 G/DL (ref 31.5–36.5)
MCV RBC AUTO: 96 FL (ref 78–100)
MONOCYTES # BLD AUTO: 0.9 10E3/UL (ref 0–1.3)
MONOCYTES NFR BLD AUTO: 10 %
NEUTROPHILS # BLD AUTO: 5.4 10E3/UL (ref 1.6–8.3)
NEUTROPHILS NFR BLD AUTO: 62 %
NRBC # BLD AUTO: 0 10E3/UL
NRBC BLD AUTO-RTO: 0 /100
PLATELET # BLD AUTO: 227 10E3/UL (ref 150–450)
POTASSIUM SERPL-SCNC: 4.2 MMOL/L (ref 3.4–5.3)
PROT SERPL-MCNC: 7.9 G/DL (ref 6.4–8.3)
RBC # BLD AUTO: 4.31 10E6/UL (ref 4.4–5.9)
SODIUM SERPL-SCNC: 136 MMOL/L (ref 135–145)
WBC # BLD AUTO: 8.7 10E3/UL (ref 4–11)

## 2025-02-13 PROCEDURE — 46050 I&D PERIANAL ABSCESS SUPFC: CPT

## 2025-02-13 PROCEDURE — 74177 CT ABD & PELVIS W/CONTRAST: CPT

## 2025-02-13 PROCEDURE — 250N000011 HC RX IP 250 OP 636: Performed by: STUDENT IN AN ORGANIZED HEALTH CARE EDUCATION/TRAINING PROGRAM

## 2025-02-13 PROCEDURE — 99285 EMERGENCY DEPT VISIT HI MDM: CPT | Mod: 25

## 2025-02-13 PROCEDURE — 85025 COMPLETE CBC W/AUTO DIFF WBC: CPT | Performed by: STUDENT IN AN ORGANIZED HEALTH CARE EDUCATION/TRAINING PROGRAM

## 2025-02-13 PROCEDURE — 82040 ASSAY OF SERUM ALBUMIN: CPT | Performed by: STUDENT IN AN ORGANIZED HEALTH CARE EDUCATION/TRAINING PROGRAM

## 2025-02-13 PROCEDURE — 250N000013 HC RX MED GY IP 250 OP 250 PS 637: Performed by: STUDENT IN AN ORGANIZED HEALTH CARE EDUCATION/TRAINING PROGRAM

## 2025-02-13 PROCEDURE — 82310 ASSAY OF CALCIUM: CPT | Performed by: STUDENT IN AN ORGANIZED HEALTH CARE EDUCATION/TRAINING PROGRAM

## 2025-02-13 PROCEDURE — 96374 THER/PROPH/DIAG INJ IV PUSH: CPT | Mod: 59

## 2025-02-13 PROCEDURE — 36415 COLL VENOUS BLD VENIPUNCTURE: CPT | Performed by: STUDENT IN AN ORGANIZED HEALTH CARE EDUCATION/TRAINING PROGRAM

## 2025-02-13 RX ORDER — IOPAMIDOL 755 MG/ML
90 INJECTION, SOLUTION INTRAVASCULAR ONCE
Status: COMPLETED | OUTPATIENT
Start: 2025-02-13 | End: 2025-02-13

## 2025-02-13 RX ORDER — KETOROLAC TROMETHAMINE 15 MG/ML
15 INJECTION, SOLUTION INTRAMUSCULAR; INTRAVENOUS ONCE
Status: COMPLETED | OUTPATIENT
Start: 2025-02-13 | End: 2025-02-13

## 2025-02-13 RX ORDER — OXYCODONE HYDROCHLORIDE 5 MG/1
5 TABLET ORAL EVERY 6 HOURS PRN
Qty: 12 TABLET | Refills: 0 | Status: SHIPPED | OUTPATIENT
Start: 2025-02-13 | End: 2025-02-16

## 2025-02-13 RX ORDER — OXYCODONE HYDROCHLORIDE 5 MG/1
5 TABLET ORAL ONCE
Status: COMPLETED | OUTPATIENT
Start: 2025-02-13 | End: 2025-02-13

## 2025-02-13 RX ADMIN — AMOXICILLIN AND CLAVULANATE POTASSIUM 1 TABLET: 875; 125 TABLET, FILM COATED ORAL at 06:01

## 2025-02-13 RX ADMIN — IOPAMIDOL 90 ML: 755 INJECTION, SOLUTION INTRAVENOUS at 04:06

## 2025-02-13 RX ADMIN — KETOROLAC TROMETHAMINE 15 MG: 15 INJECTION, SOLUTION INTRAMUSCULAR; INTRAVENOUS at 02:57

## 2025-02-13 RX ADMIN — OXYCODONE HYDROCHLORIDE 5 MG: 5 TABLET ORAL at 02:58

## 2025-02-13 ASSESSMENT — ACTIVITIES OF DAILY LIVING (ADL)
ADLS_ACUITY_SCORE: 41

## 2025-02-13 NOTE — ED PROVIDER NOTES
EMERGENCY DEPARTMENT ENCOUNTER      NAME: Rinku Jara  AGE: 49 year old male  YOB: 1975  MRN: [id number]  EVALUATION DATE & TIME: No admission date for patient encounter.    PCP: Saida Cash    ED PROVIDER: Cedrick Lay MD      Chief Complaint   Patient presents with    Hemorrhoids         FINAL IMPRESSION:  1. Perianal abscess          ED COURSE & MEDICAL DECISION MAKING:    Pertinent Labs & Imaging studies reviewed. (See chart for details)  49 year old male presents to the Emergency Department for evaluation of concern for hemorrhoids     Patient is a 49-year-old male presents emergency department for evaluation of concern of hemorrhoids.  States over the past several days been noticing pain and itching in his anal area frequent bowel movements.  No fevers.  No other abdominal pain.  No nausea or vomiting.  May be noted little bit of blood in his stool or when wiping earlier today but otherwise denies any medic easy.  No known history of hemorrhoids in the past.    Exam patient is well-appearing in no acute distress.  Hemodynamically stable and afebrile.  Abdomen is soft and nontender.  On rectal exam does have a little bit of fullness around the 6 o'clock position of the anus, no significant erythema, no observed hemorrhoids, no bleeding.    Concern for possible perianal or perirectal abscess.  Will obtain a CT abdomen pelvis along with blood work.    Labs reviewed interpreted myself.  CBC reassuring with no significant leukocytosis.  Hyperglycemia noted but no anion gap.  Normal LFTs.    CT abdomen pelvis does demonstrate a 2 cm perianal abscess around the 6 o'clock position.    ED Course as of 02/13/25 0848   u Feb 13, 2025   0548 I&D performed at bedside for perianal abscess.  Area anesthetized with 3 cc of 1% lidocaine with epinephrine.  A single stab incision made with scalpel and a few cc of purulence were expressed.  Patient noted improvement in pressure sensation after I&D.   Tolerated the procedure well.  Will cover empirically with a course of Augmentin.  Recommend following up with colorectal surgery to determine if he will need any additional intervention underlying.  Patient expressed understanding.  Otherwise discussed signs and symptoms of worse condition.  If patient develops significantly worsening pain, fevers recurrent vomiting or inability to tolerate oral medication he should return to the emergency department for repeat evaluation      2:19 AM I met and evaluated the patient. Performed rectal exam.      Medical Decision Making  Obtained supplemental history:Supplemental history obtained?: No  Reviewed external records: External records reviewed?: No  Care impacted by chronic illness:Documented in Chart  Care significantly affected by social determinants of health:N/A  Did you consider but not order tests?: Work up considered but not performed and documented in chart, if applicable  Did you interpret images independently?: Independent interpretation of ECG and images noted in documentation, when applicable.  Consultation discussion with other provider:Did you involve another provider (consultant, MH, pharmacy, etc.)?: No  Discharge. I prescribed additional prescription strength medication(s) as charted. I considered admission, but discharged patient after significant clinical improvement.  Not Applicable          At the conclusion of the encounter I discussed the results of all of the tests and the disposition. The questions were answered. The patient or family acknowledged understanding and was agreeable with the care plan.     0 minutes of critical care time     MEDICATIONS GIVEN IN THE EMERGENCY:  Medications   ketorolac (TORADOL) injection 15 mg (15 mg Intravenous $Given 2/13/25 9267)   oxyCODONE (ROXICODONE) tablet 5 mg (5 mg Oral $Given 2/13/25 9578)   iopamidol (ISOVUE-370) solution 90 mL (90 mLs Intravenous $Given 2/13/25 4360)   amoxicillin-clavulanate (AUGMENTIN)  875-125 MG per tablet 1 tablet (1 tablet Oral $Given 2/13/25 0601)       NEW PRESCRIPTIONS STARTED AT TODAY'S ER VISIT  Discharge Medication List as of 2/13/2025  5:56 AM        START taking these medications    Details   amoxicillin-clavulanate (AUGMENTIN) 875-125 MG tablet Take 1 tablet by mouth 2 times daily for 7 days., Disp-14 tablet, R-0, E-Prescribe      oxyCODONE (ROXICODONE) 5 MG tablet Take 1 tablet (5 mg) by mouth every 6 hours as needed for pain., Disp-12 tablet, R-0, E-Prescribe                =================================================================    \[facility]\""    Patient information was obtained from: Patient    Use of : N/A        Rinku WILHELM Marek is a 49 year old male with a pertinent history of T2DM who presents to this ED by walk-in for evaluation of hemorrhoids.    Patient reports rectal pain and itching for a couple of days. He decided to come to the ED because he is worried about hemorrhoids which he has never had before. He endorses a few droplets of blood from his rectum but no blood in the stools. He has been taking sitz baths without relief. He also bought topical gel to soothe the area. He continues to have pain and discomfort with sitting, thus prompting his presentation. He denies fevers, abdominal pain, constipation, and any other symptoms. No prior abdominal surgeries.    REVIEW OF SYSTEMS   Refer to the \[facility]\""    PAST MEDICAL HISTORY:  History reviewed. No pertinent past medical history.    PAST SURGICAL HISTORY:  Past Surgical History:   Procedure Laterality Date    REPAIR TENDON ACHILLES             CURRENT MEDICATIONS:    amoxicillin-clavulanate (AUGMENTIN) 875-125 MG tablet  oxyCODONE (ROXICODONE) 5 MG tablet  acetaminophen (TYLENOL) 325 MG tablet  dextroamphetamine-amphetamine (ADDERALL) 20 mg Tab  dextroamphetamine-amphetamine (AMPHETAMINE-DEXTROAMPHETAMINE) 30 mg Tab  diclofenac (VOLTAREN) 1 % topical gel  DULoxetine (CYMBALTA) 60 MG capsule  etodolac (LODINE) 300 MG  "capsule  gabapentin (NEURONTIN) 300 MG capsule  losartan (COZAAR) 50 MG tablet  metFORMIN (GLUCOPHAGE) 1000 MG tablet  methocarbamol (ROBAXIN) 500 MG tablet  methylPREDNISolone (MEDROL DOSEPAK) 4 MG tablet therapy pack  metoclopramide (REGLAN) 10 MG tablet  miscellaneous medical supply Misc  naproxen (NAPROSYN) 500 MG tablet  rosuvastatin (CRESTOR) 20 MG tablet  traMADol (ULTRAM) 50 mg tablet        ALLERGIES:  Allergies   Allergen Reactions    Acetaminophen Headache and Other (See Comments)    Propoxyphene Headache       FAMILY HISTORY:  No family history on file.    SOCIAL HISTORY:   Social History     Socioeconomic History    Marital status: Single   Tobacco Use    Smoking status: Former     Types: Cigarettes   Substance and Sexual Activity    Alcohol use: No    Drug use: No     Social Drivers of Health      Received from [employer] [facility]       VITALS:  BP (!) 157/87   Pulse 87   Temp 98.8  F (37.1  C) (Oral)   Resp 18   Ht 1.676 m (5' 6\")   Wt 91.8 kg (202 lb 6.1 oz)   SpO2 99%   BMI 32.67 kg/m      PHYSICAL EXAM    Constitutional: Well developed, Well nourished, NAD,   HENT: Normocephalic, Atraumatic,  mucous membranes moist,   Neck- trachea midline, No stridor.    Eyes:EOMI, Conjunctiva normal, No discharge.   Respiratory: Normal breath sounds, No respiratory distress, No wheezing.    Cardiovascular: Normal heart rate, Regular rhythm, No murmurs,   Abdominal: Soft, No tenderness, No rebound or guarding.     Rectal: Some fullness and induration around the 6 o'clock position of the anus, no overlying erythema, no discharge or bleeding, no external hemorrhoids visualized  Musculoskeletal: no deformity or malalignment   Integument: Warm, Dry, No erythema,   Neurologic: Alert & oriented x 3   Psychiatric: Affect normal, Cooperative.       LAB:  All pertinent labs reviewed and interpreted.  Results for orders placed or performed during the hospital encounter " of 02/13/25   CT Abdomen Pelvis w Contrast    Impression    IMPRESSION:   1.  Roughly 2 cm perirenal abscess abutting the 6:00 margin of the anal sphincter complex. Although not confirmed on this study, this may be related to a perianal fistula.  2.  Multifocal bronchiolitis in the lower lobes, possibly aspiration related.  3.  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                 Basic metabolic panel   Result Value Ref Range    Sodium 136 135 - 145 mmol/L    Potassium 4.2 3.4 - 5.3 mmol/L    Chloride 96 (L) 98 - 107 mmol/L    Carbon Dioxide (CO2) 28 22 - 29 mmol/L    Anion Gap 12 7 - 15 mmol/L    Urea Nitrogen 8.8 6.0 - 20.0 mg/dL    Creatinine 0.95 0.67 - 1.17 mg/dL    GFR Estimate >90 >60 mL/min/1.73m2    Calcium 10.4 8.8 - 10.4 mg/dL    Glucose 303 (H) 70 - 99 mg/dL   Hepatic function panel   Result Value Ref Range    Protein Total 7.9 6.4 - 8.3 g/dL    Albumin 4.2 3.5 - 5.2 g/dL    Bilirubin Total 0.2 <=1.2 mg/dL    Alkaline Phosphatase 144 40 - 150 U/L    AST 15 0 - 45 U/L    ALT 12 0 - 70 U/L    Bilirubin Direct <0.20 0.00 - 0.30 mg/dL   CBC with platelets and differential   Result Value Ref Range    WBC Count 8.7 4.0 - 11.0 10e3/uL    RBC Count 4.31 (L) 4.40 - 5.90 10e6/uL    Hemoglobin 14.0 13.3 - 17.7 g/dL    Hematocrit 41.4 40.0 - 53.0 %    MCV 96 78 - 100 fL    MCH 32.5 26.5 - 33.0 pg    MCHC 33.8 31.5 - 36.5 g/dL    RDW 13.0 10.0 - 15.0 %    Platelet Count 227 150 - 450 10e3/uL    % Neutrophils 62 %    % Lymphocytes 26 %    % Monocytes 10 %    % Eosinophils 1 %    % Basophils 0 %    % Immature Granulocytes 1 %    NRBCs per 100 WBC 0 <1 /100    Absolute Neutrophils 5.4 1.6 - 8.3 10e3/uL    Absolute Lymphocytes 2.3 0.8 - 5.3 10e3/uL    Absolute Monocytes 0.9 0.0 - 1.3 10e3/uL    Absolute Eosinophils 0.1 0.0 - 0.7 10e3/uL    Absolute Basophils 0.0 0.0 - 0.2 10e3/uL    Absolute Immature Granulocytes 0.1 <=0.4 10e3/uL    Absolute NRBCs 0.0 10e3/uL       RADIOLOGY:  Reviewed  all pertinent imaging. Please see official radiology report.  CT Abdomen Pelvis w Contrast   Final Result   IMPRESSION:    1.  Roughly 2 cm perirenal abscess abutting the 6:00 margin of the anal sphincter complex. Although not confirmed on this study, this may be related to a perianal fistula.   2.  Multifocal bronchiolitis in the lower lobes, possibly aspiration related.   3.  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                             EKG:      PROCEDURES:     PROCEDURE: Incision and Drainage   INDICATIONS: Localized abscess   PROCEDURE PROVIDER: Dr Cedrick Lay   SITE: Perianal abscess   MEDICATION: 3 mLs of 1% Lidocaine with epinephrine   NOTE: The area was prepped with chlorhexidine and draped off in the usual sterile fashion.  Local anesthetic was injected subcutaneously with anesthesia effects demonstrated prior to proceeding.  The area of maximal fluctuance was opened with a # 11 Blade ([place]) using a Single Straight incision to allow for drainage.  The abscess was drained.  The abscess cavity was bluntly explored to separate any loculations. No Packing was placed into the abscess cavity.  A sterile dressing was placed over the area.   COMPLEXITY: Simple    Simple = single, furuncle, paronychia, superficial  Complex = multiple or abscess requiring probing, loculations, packing placement   COMPLICATIONS: Patient tolerated procedure well, without complication        [facility] Documentation:   CMS Diagnoses: None             I, Shante Kent, am serving as a scribe to document services personally performed by Cedrick Lay MD based on my observation and the provider's statements to me. ICedrick MD, attest that Shante Kent is acting in a scribe capacity, has observed my performance of the services and has documented them in accordance with my direction.    Cedrick Lay MD  [facility] EMERGENCY DEPARTMENT  [address]  [facility] [address]  [phone]        Cedrick Lay MD  02/13/25 0839

## 2025-02-13 NOTE — DISCHARGE INSTRUCTIONS
Your evaluation the emergency room today shows that you do have a perianal abscess about 2 cm in size.  This was drained at bedside and we did express some pus from the abscess.  You been placed on an antibiotic to help treat possible underlying infection as well.  Continue to monitor your symptoms closely if you develop significantly worsening pain, fevers, recurrent vomiting inability to tolerate the oral medication or other concerning symptoms you should return to the emergency department for repeat evaluation.  Otherwise I do recommend following up with surgery in the next several weeks to determine if you need any further evaluation or intervention down the line

## 2025-02-17 NOTE — TELEPHONE ENCOUNTER
Diagnosis, Referred by & from: S/P I&D in ED   Appt date: 2/19/2025   NOTES STATUS DETAILS   OFFICE NOTE from referring provider N/A    [facility]  [facility]/24 - URO OV with Dr. Cobos   DISCHARGE SUMMARY from hospital N/A    DISCHARGE REPORT from the ER Internal [facility]:  2/13/25 - ED OV with Dr. Lay   OPERATIVE REPORT N/A    MEDICATION LIST Internal    LABS N/A    DIAGNOSTIC PROCEDURES N/A    IMAGING (DISC & REPORT)      CT Internal [facility]:  2/13/25 - CT Abd/Pelvis

## 2025-02-19 ENCOUNTER — PRE VISIT (OUTPATIENT)
Dept: SURGERY | Facility: CLINIC | Age: 50
End: 2025-02-19

## 2025-02-24 NOTE — TELEPHONE ENCOUNTER
Diagnosis, Referred by & from: S/P I&D in ED   Appt date: 2/25/2025   NOTES STATUS DETAILS   OFFICE NOTE from referring provider N/A    [facility]  [facility]/24 - URO OV with Dr. Cobos   DISCHARGE SUMMARY from hospital N/A    DISCHARGE REPORT from the ER Internal [facility]:  2/13/25 - ED OV with Dr. Lay   OPERATIVE REPORT N/A    MEDICATION LIST Internal    LABS N/A    DIAGNOSTIC PROCEDURES N/A    IMAGING (DISC & REPORT)      CT Internal [facility]:  2/13/25 - CT Abd/Pelvis

## 2025-02-25 ENCOUNTER — PRE VISIT (OUTPATIENT)
Dept: SURGERY | Facility: CLINIC | Age: 50
End: 2025-02-25

## 2025-02-25 ENCOUNTER — OFFICE VISIT (OUTPATIENT)
Dept: SURGERY | Facility: CLINIC | Age: 50
End: 2025-02-25
Payer: COMMERCIAL

## 2025-02-25 VITALS
OXYGEN SATURATION: 97 % | WEIGHT: 203 LBS | DIASTOLIC BLOOD PRESSURE: 89 MMHG | SYSTOLIC BLOOD PRESSURE: 162 MMHG | HEART RATE: 107 BPM | HEIGHT: 66 IN | BODY MASS INDEX: 32.62 KG/M2

## 2025-02-25 DIAGNOSIS — K61.0 PERIANAL ABSCESS: Primary | ICD-10-CM

## 2025-02-25 ASSESSMENT — PAIN SCALES - GENERAL: PAINLEVEL_OUTOF10: SEVERE PAIN (10)

## 2025-02-25 NOTE — NURSING NOTE
"Chief Complaint   Patient presents with    Follow Up     ED I&D       Vitals:    02/25/25 1301   BP: (!) 162/89   BP Location: Right arm   Patient Position: Sitting   Cuff Size: Adult Large   Pulse: 107   SpO2: 97%   Weight: 203 lb   Height: 5' 6\"       Body mass index is 32.77 kg/m .    Lizbet Mcfarland, EMT  "

## 2025-02-25 NOTE — PROGRESS NOTES
"Colon and Rectal Surgery Consult Clinic Note    Date: 2025     Referring provider:  Reyna Butt, MARCO A CNP  [address] [facility] [address]  [place] [address]     RE: Rinku Jara  : 1975  ELIZABETH: 2025    Rinku Jara is a very pleasant 49 year old male here for perianal abscess.    HPI:  Rinku had a perianal abscess with I&D 25 in the ED. He had an episode of increased drainage yesterday from this. No fevers or chills. Reports that he has gotten cysts on his buttocks in the past but not for many years. He completed his antibiotic course.  Has normal bowel movements. Normal colonoscopy about 10 years ago     Physical Examination: Exam was chaperoned by Lizbet Mcfarland, EMT   BP (!) 162/89 (BP Location: Right arm, Patient Position: Sitting, Cuff Size: Adult Large)   Pulse 107   Ht 5' 6\"   Wt 203 lb   SpO2 97%   BMI 32.77 kg/m    General: alert, oriented, in no acute distress, sitting comfortably    Perianal external examination:  I&D site in the left lateral position open with some bloody drainage and some mild induration. No erythema.     Digital rectal examination: Was deferred    Anoscopy: Was deferred    Assessment/Plan: 49 year old male with perianal abscess s/p I&D one week ago in the ED. Having some continued drainage. Discussed that this could the cavity still resolving versus anal fistula. Sitz baths several times a day and return to clinic in 2 weeks for recheck. Return in the meantime if symptoms worsen. Patient's questions were answered to his stated satisfaction and he is in agreement with this plan.     Medical history:  No past medical history on file.    Surgical history:  Past Surgical History:   Procedure Laterality Date    REPAIR TENDON ACHILLES         Problem list:    Patient Active Problem List    Diagnosis Date Noted    Finger pain 2015     Priority: Medium    Back pain 2015     Priority: Medium    Wart 2015     Priority: Medium "       Medications:  Current Outpatient Medications   Medication Sig Dispense Refill    acetaminophen (TYLENOL) 325 MG tablet Take 2 tablets (650 mg) by mouth every 6 hours as needed for mild pain 30 tablet 0    dextroamphetamine-amphetamine (ADDERALL) 20 mg Tab Take 20 mg by mouth.      dextroamphetamine-amphetamine (AMPHETAMINE-DEXTROAMPHETAMINE) 30 mg Tab Take 30 mg by mouth daily.      diclofenac (VOLTAREN) 1 % topical gel Apply 2 g topically 4 times daily 50 g 0    DULoxetine (CYMBALTA) 60 MG capsule [DULOXETINE (CYMBALTA) 60 MG CAPSULE] TAKE 1 CAPSULE BY MOUTH ONCE DAILY. ALONG WITH 30MG CAP (TOTAL DOSE 90MG DAILY)      gabapentin (NEURONTIN) 300 MG capsule Take 1 capsule (300 mg) by mouth 3 times daily 30 capsule 0    losartan (COZAAR) 50 MG tablet Take 50 mg by mouth      metFORMIN (GLUCOPHAGE) 1000 MG tablet [METFORMIN (GLUCOPHAGE) 1000 MG TABLET] Take 1 tablet (1,000 mg total) by mouth 2 (two) times a day with meals. 30 tablet 0    methocarbamol (ROBAXIN) 500 MG tablet Take 1 tablet (500 mg) by mouth 4 times daily as needed for muscle spasms 40 tablet 0    methylPREDNISolone (MEDROL DOSEPAK) 4 MG tablet therapy pack Follow Package Directions 21 tablet 0    metoclopramide (REGLAN) 10 MG tablet [METOCLOPRAMIDE (REGLAN) 10 MG TABLET] Take 1 tablet (10 mg total) by mouth 2 (two) times a day as needed (at first symptom of headache). 10 tablet 0    miscellaneous medical supply Misc [MISCELLANEOUS MEDICAL SUPPLY MISC] For home use: Pressure:7 cm H2O Length of Need: Lifetime      naproxen (NAPROSYN) 500 MG tablet Take 1 tablet (500 mg) by mouth 2 times daily (with meals) 24 tablet 0    rosuvastatin (CRESTOR) 20 MG tablet 1 tablet Orally Once a day for 90 days      etodolac (LODINE) 300 MG capsule [ETODOLAC (LODINE) 300 MG CAPSULE] Take 1 capsule (300 mg total) by mouth every 8 (eight) hours. Take with food (Patient not taking: Reported on 2/25/2025) 90 capsule 1    traMADol (ULTRAM) 50 mg tablet [TRAMADOL (ULTRAM)  "50 MG TABLET] Take 50 mg by mouth. (Patient not taking: Reported on 2/25/2025)         Allergies:  Allergies   Allergen Reactions    Acetaminophen Headache and Other (See Comments)    Propoxyphene Headache       Family history:  No family history on file.    Social history:  Social History     Tobacco Use    Smoking status: Former     Types: Cigarettes    Smokeless tobacco: Not on file   Substance Use Topics    Alcohol use: No    Marital status: single.    Nursing Notes:   Lizbet Mcfarland  2/25/2025  1:03 PM  Signed  Chief Complaint   Patient presents with    Follow Up     ED I&D       Vitals:    02/25/25 1301   BP: (!) 162/89   BP Location: Right arm   Patient Position: Sitting   Cuff Size: Adult Large   Pulse: 107   SpO2: 97%   Weight: 203 lb   Height: 5' 6\"       Body mass index is 32.77 kg/m .    MIC Espinosa     20 minutes spent on the date of encounter performing chart review, history and exam, documentation and further activities as noted above    MARCO A Edwards, NP-C  Colon and Rectal Surgery   [facility]    This note was created using speech recognition software and may contain unintended word substitutions.    "

## 2025-02-25 NOTE — LETTER
"2025       RE: Rinku Jara  [id number]  [place] [address]     Dear Colleague,    Thank you for referring your patient, Rinku Jara, to the [facility] COLON AND RECTAL SURGERY [facility]IN[facility] [place] [facility]. Please see a copy of my visit note below.    Colon and Rectal Surgery Consult Clinic Note    Date: 2025     Referring provider:  Reyna Butt, MARCO A CNP  [address] [facility] [address]  [place] [address]     RE: Rinku Jara  : 1975  ELIZABETH: 2025    Rinku Jara is a very pleasant 49 year old male here for perianal abscess.    HPI:  Rinku had a perianal abscess with I&D 25 in the ED. He had an episode of increased drainage yesterday from this. No fevers or chills. Reports that he has gotten cysts on his buttocks in the past but not for many years. He completed his antibiotic course.  Has normal bowel movements. Normal colonoscopy about 10 years ago     Physical Examination: Exam was chaperoned by Lizbet Mcfarland, EMT   BP (!) 162/89 (BP Location: Right arm, Patient Position: Sitting, Cuff Size: Adult Large)   Pulse 107   Ht 5' 6\"   Wt 203 lb   SpO2 97%   BMI 32.77 kg/m    General: alert, oriented, in no acute distress, sitting comfortably    Perianal external examination:  I&D site in the left lateral position open with some bloody drainage and some mild induration. No erythema.     Digital rectal examination: Was deferred    Anoscopy: Was deferred    Assessment/Plan: 49 year old male with perianal abscess s/p I&D one week ago in the ED. Having some continued drainage. Discussed that this could the cavity still resolving versus anal fistula. Sitz baths several times a day and return to clinic in 2 weeks for recheck. Return in the meantime if symptoms worsen. Patient's questions were answered to his stated satisfaction and he is in agreement with this plan.     Medical history:  No past medical " history on file.    Surgical history:  Past Surgical History:   Procedure Laterality Date     REPAIR TENDON ACHILLES         Problem list:    Patient Active Problem List    Diagnosis Date Noted     Finger pain 04/13/2015     Priority: Medium     Back pain 04/11/2015     Priority: Medium     Wart 04/11/2015     Priority: Medium       Medications:  Current Outpatient Medications   Medication Sig Dispense Refill     acetaminophen (TYLENOL) 325 MG tablet Take 2 tablets (650 mg) by mouth every 6 hours as needed for mild pain 30 tablet 0     dextroamphetamine-amphetamine (ADDERALL) 20 mg Tab Take 20 mg by mouth.       dextroamphetamine-amphetamine (AMPHETAMINE-DEXTROAMPHETAMINE) 30 mg Tab Take 30 mg by mouth daily.       diclofenac (VOLTAREN) 1 % topical gel Apply 2 g topically 4 times daily 50 g 0     DULoxetine (CYMBALTA) 60 MG capsule [DULOXETINE (CYMBALTA) 60 MG CAPSULE] TAKE 1 CAPSULE BY MOUTH ONCE DAILY. ALONG WITH 30MG CAP (TOTAL DOSE 90MG DAILY)       gabapentin (NEURONTIN) 300 MG capsule Take 1 capsule (300 mg) by mouth 3 times daily 30 capsule 0     losartan (COZAAR) 50 MG tablet Take 50 mg by mouth       metFORMIN (GLUCOPHAGE) 1000 MG tablet [METFORMIN (GLUCOPHAGE) 1000 MG TABLET] Take 1 tablet (1,000 mg total) by mouth 2 (two) times a day with meals. 30 tablet 0     methocarbamol (ROBAXIN) 500 MG tablet Take 1 tablet (500 mg) by mouth 4 times daily as needed for muscle spasms 40 tablet 0     methylPREDNISolone (MEDROL DOSEPAK) 4 MG tablet therapy pack Follow Package Directions 21 tablet 0     metoclopramide (REGLAN) 10 MG tablet [METOCLOPRAMIDE (REGLAN) 10 MG TABLET] Take 1 tablet (10 mg total) by mouth 2 (two) times a day as needed (at first symptom of headache). 10 tablet 0     miscellaneous medical supply Misc [MISCELLANEOUS MEDICAL SUPPLY MISC] For home use: Pressure:7 cm H2O Length of Need: Lifetime       naproxen (NAPROSYN) 500 MG tablet Take 1 tablet (500 mg) by mouth 2 times daily (with meals) 24  "tablet 0     rosuvastatin (CRESTOR) 20 MG tablet 1 tablet Orally Once a day for 90 days       etodolac (LODINE) 300 MG capsule [ETODOLAC (LODINE) 300 MG CAPSULE] Take 1 capsule (300 mg total) by mouth every 8 (eight) hours. Take with food (Patient not taking: Reported on 2/25/2025) 90 capsule 1     traMADol (ULTRAM) 50 mg tablet [TRAMADOL (ULTRAM) 50 MG TABLET] Take 50 mg by mouth. (Patient not taking: Reported on 2/25/2025)         Allergies:  Allergies   Allergen Reactions     Acetaminophen Headache and Other (See Comments)     Propoxyphene Headache       Family history:  No family history on file.    Social history:  Social History     Tobacco Use     Smoking status: Former     Types: Cigarettes     Smokeless tobacco: Not on file   Substance Use Topics     Alcohol use: No    Marital status: single.    Nursing Notes:   Lizbet Mcfarland  2/25/2025  1:03 PM  Signed  Chief Complaint   Patient presents with     Follow Up     ED I&D       Vitals:    02/25/25 1301   BP: (!) 162/89   BP Location: Right arm   Patient Position: Sitting   Cuff Size: Adult Large   Pulse: 107   SpO2: 97%   Weight: 203 lb   Height: 5' 6\"       Body mass index is 32.77 kg/m .    Lizbet Mcfarland, EMT     20 minutes spent on the date of encounter performing chart review, history and exam, documentation and further activities as noted above    MARCO A Garcia, NP-C  Colon and Rectal Surgery   [facility]    This note was created using speech recognition software and may contain unintended word substitutions.      Again, thank you for allowing me to participate in the care of your patient.      Sincerely,    MARCO A Garcia CNP    "

## 2025-06-06 ENCOUNTER — TRANSFERRED RECORDS (OUTPATIENT)
Dept: HEALTH INFORMATION MANAGEMENT | Facility: CLINIC | Age: 50
End: 2025-06-06
Payer: COMMERCIAL

## 2025-06-06 ENCOUNTER — MEDICAL CORRESPONDENCE (OUTPATIENT)
Dept: HEALTH INFORMATION MANAGEMENT | Facility: CLINIC | Age: 50
End: 2025-06-06
Payer: COMMERCIAL

## 2025-06-09 ENCOUNTER — TRANSCRIBE ORDERS (OUTPATIENT)
Dept: OTHER | Age: 50
End: 2025-06-09

## 2025-06-09 DIAGNOSIS — M24.173 ANKLE AND FOOT JOINT DERANGEMENT: Primary | ICD-10-CM

## 2025-06-09 DIAGNOSIS — M24.176 ANKLE AND FOOT JOINT DERANGEMENT: Primary | ICD-10-CM

## 2025-07-17 ENCOUNTER — TELEPHONE (OUTPATIENT)
Dept: SURGERY | Facility: CLINIC | Age: 50
End: 2025-07-17
Payer: COMMERCIAL

## 2025-07-17 NOTE — TELEPHONE ENCOUNTER
[facility]    Phone Message    May a detailed message be left on voicemail: yes     Reason for Call: Other: Received phone call from patient stating he needs to be seen again for a Perianal Abscess. Pt was last seen in February 2025 and was told that if he had symptoms again to follow up. Per protocols a high priority T.E needs to be sent as there is no recent referral for pt. Please follow up with pt        Action Taken: Message routed to:  [facility] Colon [facility] Rectal     Travel Screening: Not Applicable     Date of Service:

## 2025-07-17 NOTE — CONFIDENTIAL NOTE
Perianal Abscess triage:    Onset: 7 days ago. Hasn't subsided. Has been growing.   Pain: Yes  Drainage: Yes, intermittent- Yellow/red in color. Has odor.   Fevers/chills: No     Recommended patient seen today in clinic for possible incision and drainage. He cannot come today due to no ride, but he can make it tomorrow. Added on 7/18.

## 2025-07-30 ENCOUNTER — TELEPHONE (OUTPATIENT)
Dept: PODIATRY | Facility: CLINIC | Age: 50
End: 2025-07-30

## 2025-07-30 NOTE — TELEPHONE ENCOUNTER
LVMTCB #1 to reschedule missed podiatry appointment from 7/30/2025.  First letter sent as well. [phone]

## 2025-08-12 ENCOUNTER — TELEPHONE (OUTPATIENT)
Dept: PODIATRY | Facility: CLINIC | Age: 50
End: 2025-08-12
Payer: COMMERCIAL